# Patient Record
Sex: FEMALE | Race: BLACK OR AFRICAN AMERICAN | NOT HISPANIC OR LATINO
[De-identification: names, ages, dates, MRNs, and addresses within clinical notes are randomized per-mention and may not be internally consistent; named-entity substitution may affect disease eponyms.]

---

## 2017-08-08 ENCOUNTER — APPOINTMENT (OUTPATIENT)
Dept: RADIOLOGY | Facility: CLINIC | Age: 62
End: 2017-08-08
Payer: MEDICAID

## 2017-08-08 ENCOUNTER — APPOINTMENT (OUTPATIENT)
Dept: MAMMOGRAPHY | Facility: CLINIC | Age: 62
End: 2017-08-08
Payer: MEDICAID

## 2017-08-08 ENCOUNTER — OUTPATIENT (OUTPATIENT)
Dept: OUTPATIENT SERVICES | Facility: HOSPITAL | Age: 62
LOS: 1 days | End: 2017-08-08
Payer: MEDICAID

## 2017-08-08 DIAGNOSIS — Z00.8 ENCOUNTER FOR OTHER GENERAL EXAMINATION: ICD-10-CM

## 2017-08-08 DIAGNOSIS — D25.9 LEIOMYOMA OF UTERUS, UNSPECIFIED: Chronic | ICD-10-CM

## 2017-08-08 PROCEDURE — G0202: CPT | Mod: 26

## 2017-08-08 PROCEDURE — 77063 BREAST TOMOSYNTHESIS BI: CPT | Mod: 26

## 2017-08-08 PROCEDURE — 71020: CPT | Mod: 26

## 2017-08-08 PROCEDURE — 71046 X-RAY EXAM CHEST 2 VIEWS: CPT

## 2017-08-08 PROCEDURE — 77067 SCR MAMMO BI INCL CAD: CPT

## 2017-08-08 PROCEDURE — 77063 BREAST TOMOSYNTHESIS BI: CPT

## 2017-08-09 ENCOUNTER — APPOINTMENT (OUTPATIENT)
Dept: ULTRASOUND IMAGING | Facility: CLINIC | Age: 62
End: 2017-08-09
Payer: MEDICAID

## 2017-08-09 ENCOUNTER — OUTPATIENT (OUTPATIENT)
Dept: OUTPATIENT SERVICES | Facility: HOSPITAL | Age: 62
LOS: 1 days | End: 2017-08-09
Payer: MEDICAID

## 2017-08-09 DIAGNOSIS — Z00.8 ENCOUNTER FOR OTHER GENERAL EXAMINATION: ICD-10-CM

## 2017-08-09 DIAGNOSIS — D25.9 LEIOMYOMA OF UTERUS, UNSPECIFIED: Chronic | ICD-10-CM

## 2017-08-09 PROCEDURE — 76856 US EXAM PELVIC COMPLETE: CPT | Mod: 26

## 2017-08-09 PROCEDURE — 76856 US EXAM PELVIC COMPLETE: CPT

## 2017-08-09 PROCEDURE — 76830 TRANSVAGINAL US NON-OB: CPT | Mod: 26

## 2017-08-09 PROCEDURE — 76830 TRANSVAGINAL US NON-OB: CPT

## 2017-09-01 ENCOUNTER — APPOINTMENT (OUTPATIENT)
Dept: OBGYN | Facility: CLINIC | Age: 62
End: 2017-09-01
Payer: MEDICAID

## 2017-09-01 VITALS
HEIGHT: 65 IN | WEIGHT: 188 LBS | DIASTOLIC BLOOD PRESSURE: 80 MMHG | BODY MASS INDEX: 31.32 KG/M2 | SYSTOLIC BLOOD PRESSURE: 122 MMHG

## 2017-09-01 DIAGNOSIS — Z86.19 PERSONAL HISTORY OF OTHER INFECTIOUS AND PARASITIC DISEASES: ICD-10-CM

## 2017-09-01 PROCEDURE — 99396 PREV VISIT EST AGE 40-64: CPT

## 2017-09-01 PROCEDURE — 82270 OCCULT BLOOD FECES: CPT

## 2017-09-01 RX ORDER — ATORVASTATIN CALCIUM 40 MG/1
40 TABLET, FILM COATED ORAL
Qty: 90 | Refills: 0 | Status: ACTIVE | COMMUNITY
Start: 2017-08-02

## 2017-09-01 RX ORDER — LISINOPRIL 20 MG/1
20 TABLET ORAL
Qty: 30 | Refills: 0 | Status: ACTIVE | COMMUNITY
Start: 2017-08-02

## 2017-09-03 LAB
BACTERIA UR CULT: NORMAL
C TRACH RRNA SPEC QL NAA+PROBE: NORMAL
HPV HIGH+LOW RISK DNA PNL CVX: NEGATIVE
N GONORRHOEA RRNA SPEC QL NAA+PROBE: NORMAL
SOURCE AMPLIFICATION: NORMAL
SOURCE TP AMPLIFICATION: NORMAL
T VAGINALIS RRNA SPEC QL NAA+PROBE: NORMAL

## 2017-09-06 ENCOUNTER — APPOINTMENT (OUTPATIENT)
Dept: MRI IMAGING | Facility: CLINIC | Age: 62
End: 2017-09-06

## 2017-09-07 LAB — CYTOLOGY CVX/VAG DOC THIN PREP: NORMAL

## 2018-06-11 ENCOUNTER — APPOINTMENT (OUTPATIENT)
Dept: OBGYN | Facility: CLINIC | Age: 63
End: 2018-06-11
Payer: MEDICAID

## 2018-06-11 VITALS
BODY MASS INDEX: 31.65 KG/M2 | HEIGHT: 65 IN | WEIGHT: 190 LBS | SYSTOLIC BLOOD PRESSURE: 130 MMHG | DIASTOLIC BLOOD PRESSURE: 100 MMHG

## 2018-06-11 DIAGNOSIS — Z87.440 PERSONAL HISTORY OF URINARY (TRACT) INFECTIONS: ICD-10-CM

## 2018-06-11 PROCEDURE — 99213 OFFICE O/P EST LOW 20 MIN: CPT

## 2018-06-11 RX ORDER — NYSTATIN AND TRIAMCINOLONE ACETONIDE 100000; 1 MG/G; MG/G
100000-0.1 CREAM TOPICAL 3 TIMES DAILY
Qty: 9 | Refills: 0 | Status: DISCONTINUED | COMMUNITY
Start: 2018-06-11 | End: 2018-06-11

## 2018-06-11 RX ORDER — GABAPENTIN 300 MG/1
300 CAPSULE ORAL
Qty: 28 | Refills: 0 | Status: DISCONTINUED | COMMUNITY
Start: 2017-08-12 | End: 2018-06-11

## 2018-06-12 LAB
CANDIDA VAG CYTO: NOT DETECTED
G VAGINALIS+PREV SP MTYP VAG QL MICRO: DETECTED
T VAGINALIS VAG QL WET PREP: NOT DETECTED

## 2018-06-13 LAB — BACTERIA UR CULT: NORMAL

## 2018-10-16 ENCOUNTER — APPOINTMENT (OUTPATIENT)
Dept: OBGYN | Facility: CLINIC | Age: 63
End: 2018-10-16
Payer: COMMERCIAL

## 2018-10-16 VITALS
WEIGHT: 190 LBS | BODY MASS INDEX: 31.65 KG/M2 | SYSTOLIC BLOOD PRESSURE: 130 MMHG | DIASTOLIC BLOOD PRESSURE: 90 MMHG | HEIGHT: 65 IN

## 2018-10-16 DIAGNOSIS — Z80.49 FAMILY HISTORY OF MALIGNANT NEOPLASM OF OTHER GENITAL ORGANS: ICD-10-CM

## 2018-10-16 PROCEDURE — 82270 OCCULT BLOOD FECES: CPT

## 2018-10-16 PROCEDURE — 99396 PREV VISIT EST AGE 40-64: CPT

## 2018-10-18 DIAGNOSIS — N76.0 ACUTE VAGINITIS: ICD-10-CM

## 2018-10-18 DIAGNOSIS — B96.89 ACUTE VAGINITIS: ICD-10-CM

## 2018-10-18 LAB
CANDIDA VAG CYTO: NOT DETECTED
G VAGINALIS+PREV SP MTYP VAG QL MICRO: DETECTED
HPV HIGH+LOW RISK DNA PNL CVX: NOT DETECTED
T VAGINALIS VAG QL WET PREP: NOT DETECTED

## 2018-10-20 LAB — CYTOLOGY CVX/VAG DOC THIN PREP: NORMAL

## 2019-07-29 ENCOUNTER — APPOINTMENT (OUTPATIENT)
Dept: OBGYN | Facility: CLINIC | Age: 64
End: 2019-07-29

## 2020-09-04 ENCOUNTER — APPOINTMENT (OUTPATIENT)
Dept: OBGYN | Facility: CLINIC | Age: 65
End: 2020-09-04

## 2020-10-06 ENCOUNTER — APPOINTMENT (OUTPATIENT)
Dept: OBGYN | Facility: CLINIC | Age: 65
End: 2020-10-06
Payer: COMMERCIAL

## 2020-10-06 VITALS
BODY MASS INDEX: 32.49 KG/M2 | DIASTOLIC BLOOD PRESSURE: 90 MMHG | SYSTOLIC BLOOD PRESSURE: 165 MMHG | HEIGHT: 65 IN | WEIGHT: 195 LBS

## 2020-10-06 LAB
BILIRUB UR QL STRIP: NORMAL
GLUCOSE UR-MCNC: NORMAL
HCG UR QL: 0.2 EU/DL
HGB UR QL STRIP.AUTO: NORMAL
KETONES UR-MCNC: NORMAL
LEUKOCYTE ESTERASE UR QL STRIP: NORMAL
NITRITE UR QL STRIP: NORMAL
PH UR STRIP: 6.5
PROT UR STRIP-MCNC: NORMAL
SP GR UR STRIP: 1.01

## 2020-10-06 PROCEDURE — 81003 URINALYSIS AUTO W/O SCOPE: CPT | Mod: QW

## 2020-10-06 PROCEDURE — 99396 PREV VISIT EST AGE 40-64: CPT

## 2020-10-06 RX ORDER — TERCONAZOLE 8 MG/G
0.8 CREAM VAGINAL
Qty: 3 | Refills: 0 | Status: DISCONTINUED | COMMUNITY
Start: 2017-09-01 | End: 2020-10-06

## 2020-10-06 RX ORDER — METRONIDAZOLE 7.5 MG/G
0.75 GEL VAGINAL
Qty: 1 | Refills: 0 | Status: DISCONTINUED | COMMUNITY
Start: 2018-10-18 | End: 2020-10-06

## 2020-10-06 RX ORDER — NYSTATIN 100000 [USP'U]/G
100000 CREAM TOPICAL TWICE DAILY
Qty: 1 | Refills: 3 | Status: DISCONTINUED | COMMUNITY
Start: 2018-06-11 | End: 2020-10-06

## 2020-10-06 RX ORDER — METRONIDAZOLE 7.5 MG/G
0.75 GEL VAGINAL
Qty: 1 | Refills: 0 | Status: DISCONTINUED | COMMUNITY
Start: 2018-06-12 | End: 2020-10-06

## 2020-10-06 RX ORDER — TRIAMCINOLONE ACETONIDE 1 MG/G
0.1 CREAM TOPICAL TWICE DAILY
Qty: 1 | Refills: 0 | Status: DISCONTINUED | COMMUNITY
Start: 2018-06-11 | End: 2020-10-06

## 2020-10-06 NOTE — REVIEW OF SYSTEMS
[Fatigue] : fatigue [Recent Weight Gain (___ Lbs)] : recent [unfilled] ~Ulb weight gain [Sight Problems] : sight problems [SOB on Exertion] : shortness of breath on exertion [Constipation] : constipation [Feeling Weak] : feeling weak [Negative] : Heme/Lymph

## 2020-10-06 NOTE — HISTORY OF PRESENT ILLNESS
[FreeTextEntry1] : Check up.  Occ vaginal discharge and itching.  None at present.  Denies vag bleeding.  Refuses sti screen.  Colonoscopy 2019.  Weight stable.  Socially distancing.  Did not go for mammo last year.

## 2020-10-09 LAB
BACTERIA UR CULT: NORMAL
CANDIDA VAG CYTO: NOT DETECTED
G VAGINALIS+PREV SP MTYP VAG QL MICRO: NOT DETECTED
HPV HIGH+LOW RISK DNA PNL CVX: NOT DETECTED
T VAGINALIS VAG QL WET PREP: NOT DETECTED

## 2020-10-11 LAB — CYTOLOGY CVX/VAG DOC THIN PREP: NORMAL

## 2021-10-15 ENCOUNTER — APPOINTMENT (OUTPATIENT)
Dept: OBGYN | Facility: CLINIC | Age: 66
End: 2021-10-15
Payer: COMMERCIAL

## 2021-10-15 VITALS
HEIGHT: 65 IN | WEIGHT: 191 LBS | BODY MASS INDEX: 31.82 KG/M2 | DIASTOLIC BLOOD PRESSURE: 78 MMHG | SYSTOLIC BLOOD PRESSURE: 122 MMHG

## 2021-10-15 DIAGNOSIS — Z01.419 ENCOUNTER FOR GYNECOLOGICAL EXAMINATION (GENERAL) (ROUTINE) W/OUT ABNORMAL FINDINGS: ICD-10-CM

## 2021-10-15 DIAGNOSIS — N89.8 OTHER SPECIFIED NONINFLAMMATORY DISORDERS OF VAGINA: ICD-10-CM

## 2021-10-15 DIAGNOSIS — R10.2 PELVIC AND PERINEAL PAIN: ICD-10-CM

## 2021-10-15 PROCEDURE — 99396 PREV VISIT EST AGE 40-64: CPT

## 2021-10-15 RX ORDER — METFORMIN HYDROCHLORIDE 1000 MG/1
1000 TABLET, COATED ORAL
Refills: 0 | Status: ACTIVE | COMMUNITY
Start: 2021-10-15

## 2021-10-15 RX ORDER — TERCONAZOLE 8 MG/G
0.8 CREAM VAGINAL
Qty: 1 | Refills: 0 | Status: ACTIVE | COMMUNITY
Start: 2021-10-15 | End: 1900-01-01

## 2021-10-15 RX ORDER — GLIMEPIRIDE 4 MG/1
4 TABLET ORAL
Refills: 0 | Status: ACTIVE | COMMUNITY
Start: 2021-10-15

## 2021-10-15 RX ORDER — SITAGLIPTIN 100 MG/1
100 TABLET, FILM COATED ORAL
Refills: 0 | Status: ACTIVE | COMMUNITY
Start: 2021-10-15

## 2021-10-15 NOTE — HISTORY OF PRESENT ILLNESS
[FreeTextEntry1] : Check up.  Feels well.  Occ discharge and itching.  Late for mammo and dexa.  Colonoscopy 2020.  Covid 19 vaccinated.  Refuses sti screen.

## 2021-10-16 LAB — HPV HIGH+LOW RISK DNA PNL CVX: NOT DETECTED

## 2021-10-17 LAB — BACTERIA UR CULT: NORMAL

## 2021-10-25 LAB — CYTOLOGY CVX/VAG DOC THIN PREP: ABNORMAL

## 2021-12-13 ENCOUNTER — RESULT REVIEW (OUTPATIENT)
Age: 66
End: 2021-12-13

## 2021-12-13 ENCOUNTER — OUTPATIENT (OUTPATIENT)
Dept: OUTPATIENT SERVICES | Facility: HOSPITAL | Age: 66
LOS: 1 days | End: 2021-12-13
Payer: COMMERCIAL

## 2021-12-13 ENCOUNTER — APPOINTMENT (OUTPATIENT)
Dept: ULTRASOUND IMAGING | Facility: CLINIC | Age: 66
End: 2021-12-13
Payer: COMMERCIAL

## 2021-12-13 DIAGNOSIS — D25.9 LEIOMYOMA OF UTERUS, UNSPECIFIED: Chronic | ICD-10-CM

## 2021-12-13 DIAGNOSIS — Z00.00 ENCOUNTER FOR GENERAL ADULT MEDICAL EXAMINATION WITHOUT ABNORMAL FINDINGS: ICD-10-CM

## 2021-12-13 PROCEDURE — 76536 US EXAM OF HEAD AND NECK: CPT | Mod: 26

## 2021-12-13 PROCEDURE — 76536 US EXAM OF HEAD AND NECK: CPT

## 2021-12-16 ENCOUNTER — APPOINTMENT (OUTPATIENT)
Dept: RADIOLOGY | Facility: CLINIC | Age: 66
End: 2021-12-16
Payer: COMMERCIAL

## 2021-12-16 ENCOUNTER — OUTPATIENT (OUTPATIENT)
Dept: OUTPATIENT SERVICES | Facility: HOSPITAL | Age: 66
LOS: 1 days | End: 2021-12-16
Payer: COMMERCIAL

## 2021-12-16 ENCOUNTER — RESULT REVIEW (OUTPATIENT)
Age: 66
End: 2021-12-16

## 2021-12-16 DIAGNOSIS — D25.9 LEIOMYOMA OF UTERUS, UNSPECIFIED: Chronic | ICD-10-CM

## 2021-12-16 DIAGNOSIS — Z00.8 ENCOUNTER FOR OTHER GENERAL EXAMINATION: ICD-10-CM

## 2021-12-16 PROCEDURE — 77080 DXA BONE DENSITY AXIAL: CPT | Mod: 26

## 2021-12-16 PROCEDURE — 77080 DXA BONE DENSITY AXIAL: CPT

## 2022-01-25 ENCOUNTER — APPOINTMENT (OUTPATIENT)
Dept: MAMMOGRAPHY | Facility: CLINIC | Age: 67
End: 2022-01-25

## 2022-01-25 ENCOUNTER — APPOINTMENT (OUTPATIENT)
Dept: ULTRASOUND IMAGING | Facility: CLINIC | Age: 67
End: 2022-01-25

## 2024-01-03 ENCOUNTER — INPATIENT (INPATIENT)
Facility: HOSPITAL | Age: 69
LOS: 2 days | Discharge: HOME HEALTH | End: 2024-01-06
Attending: INTERNAL MEDICINE | Admitting: INTERNAL MEDICINE
Payer: MEDICARE

## 2024-01-03 VITALS
TEMPERATURE: 98 F | HEART RATE: 83 BPM | DIASTOLIC BLOOD PRESSURE: 96 MMHG | RESPIRATION RATE: 15 BRPM | SYSTOLIC BLOOD PRESSURE: 160 MMHG | OXYGEN SATURATION: 100 %

## 2024-01-03 DIAGNOSIS — D25.9 LEIOMYOMA OF UTERUS, UNSPECIFIED: Chronic | ICD-10-CM

## 2024-01-03 PROCEDURE — 73564 X-RAY EXAM KNEE 4 OR MORE: CPT | Mod: 26,50

## 2024-01-03 PROCEDURE — 99285 EMERGENCY DEPT VISIT HI MDM: CPT | Mod: 25

## 2024-01-03 PROCEDURE — 20610 DRAIN/INJ JOINT/BURSA W/O US: CPT | Mod: LT

## 2024-01-03 NOTE — ED PROVIDER NOTE - ATTENDING APP SHARED VISIT CONTRIBUTION OF CARE
I have personally performed a face to face medical and diagnostic evaluation of the patient. I have discussed with and reviewed the ACP's note and agree with the History, ROS, Physical Exam and MDM unless otherwise indicated. A brief summary of my personal evaluation and impression can be found below.     68-year-old female, past medical history of hypertension, diabetes, chronic bilateral knee pain secondary to osteoarthritis presented to the ED for acute exacerbation of bilateral knee pain. On presentation patient appears uncomfortable, on exam diffuse swelling of the left knee without redness crepitus, or signs of septic joint.  Plan for routine labs, analgesics, x-ray, joint arthrocentesis, CT abdomen pelvis to rule out possible diverticulitis. Reassess to dispo. Will assess results/pain/ ambulatory status for dispo. John Alvarez, ED Attending

## 2024-01-03 NOTE — ED PROVIDER NOTE - MUSCULOSKELETAL, MLM
+ tenderness swelling of the left knee > greater than right. no redness, crepitus, limitations with ROM. unable to bear weight. Spine appears normal, range of motion is not limited, no muscle or joint tenderness

## 2024-01-03 NOTE — ED ADULT TRIAGE NOTE - CHIEF COMPLAINT QUOTE
presents C/O B/L knee pain. unable to walk. deneis trauma or injury. No complaints of chest pain, headache, nausea, dizziness, vomiting  SOB, fever, chills verbalized. phxDM2 HTN

## 2024-01-03 NOTE — ED PROVIDER NOTE - PROGRESS NOTE DETAILS
On reassessment despite pain medication, joint aspiration patient still unable to ambulate.  Patient will be admitted for pain control and PT eval

## 2024-01-03 NOTE — ED PROVIDER NOTE - NSICDXPASTSURGICALHX_GEN_ALL_CORE_FT
PAST SURGICAL HISTORY:  Fibroid uterus had surgery ( 2 years ago )    Missed      S/P  Section times 2-,     Status Post Biopsy Uterine-11    Status Post Umbilical Hernia Repair

## 2024-01-03 NOTE — ED PROVIDER NOTE - CLINICAL SUMMARY MEDICAL DECISION MAKING FREE TEXT BOX
Patient is a 68-year-old female, past medical history of hypertension, diabetes, chronic bilateral knee pain secondary to osteoarthritis presented to the ED for acute exacerbation of bilateral knee pain. On presentation patient appears uncomfortable, on exam diffuse swelling of the left knee without redness crepitus, or signs of septic joint.  Plan for routine labs, analgesics, x-ray, joint arthrocentesis, CT abdomen pelvis to rule out possible diverticulitis.

## 2024-01-03 NOTE — ED PROVIDER NOTE - NSICDXPASTMEDICALHX_GEN_ALL_CORE_FT
PAST MEDICAL HISTORY:  Bulging Discs     CAD (Coronary Artery Disease)     Cardiomegaly     Diabetes mellitus     Fibroids     GERD (Gastroesophageal Reflux Disease)     Herniated Disc Lumbar    HTN (Hypertension)     Hyperlipidemia     Hyperthyroidism     Hyperthyroidism     Obese     Sickle Cell Trait

## 2024-01-03 NOTE — ED PROVIDER NOTE - OBJECTIVE STATEMENT
Patient is a 68-year-old female, past medical history of hypertension, diabetes, chronic bilateral knee pain secondary to osteoarthritis presented to the ED for acute exacerbation of bilateral knee pain.  She reports sharp pain along the joint line/behind kneecap.  Patient denies recent injury, trauma prior to the onset of pain.  No associated redness, fever, chills no limitations to range of motion. Patient is a 68-year-old female, past medical history of hypertension, diabetes, chronic bilateral knee pain secondary to osteoarthritis presented to the ED for acute exacerbation of bilateral knee pain.  She reports sharp pain along the joint line/behind kneecap.  Patient denies recent injury, trauma prior to the onset of pain.  No associated redness, fever, chills no limitations to range of motion, redness of joint.

## 2024-01-04 DIAGNOSIS — I10 ESSENTIAL (PRIMARY) HYPERTENSION: ICD-10-CM

## 2024-01-04 DIAGNOSIS — M25.469 EFFUSION, UNSPECIFIED KNEE: ICD-10-CM

## 2024-01-04 DIAGNOSIS — M79.605 PAIN IN LEFT LEG: ICD-10-CM

## 2024-01-04 DIAGNOSIS — N18.9 CHRONIC KIDNEY DISEASE, UNSPECIFIED: ICD-10-CM

## 2024-01-04 DIAGNOSIS — E78.5 HYPERLIPIDEMIA, UNSPECIFIED: ICD-10-CM

## 2024-01-04 DIAGNOSIS — M25.561 PAIN IN RIGHT KNEE: ICD-10-CM

## 2024-01-04 DIAGNOSIS — E11.9 TYPE 2 DIABETES MELLITUS WITHOUT COMPLICATIONS: ICD-10-CM

## 2024-01-04 DIAGNOSIS — Z29.9 ENCOUNTER FOR PROPHYLACTIC MEASURES, UNSPECIFIED: ICD-10-CM

## 2024-01-04 DIAGNOSIS — I25.10 ATHEROSCLEROTIC HEART DISEASE OF NATIVE CORONARY ARTERY WITHOUT ANGINA PECTORIS: ICD-10-CM

## 2024-01-04 LAB
A1C WITH ESTIMATED AVERAGE GLUCOSE RESULT: 10.1 % — HIGH (ref 4–5.6)
A1C WITH ESTIMATED AVERAGE GLUCOSE RESULT: 10.1 % — HIGH (ref 4–5.6)
ALBUMIN SERPL ELPH-MCNC: 3.8 G/DL — SIGNIFICANT CHANGE UP (ref 3.3–5)
ALBUMIN SERPL ELPH-MCNC: 3.8 G/DL — SIGNIFICANT CHANGE UP (ref 3.3–5)
ALP SERPL-CCNC: 82 U/L — SIGNIFICANT CHANGE UP (ref 40–120)
ALP SERPL-CCNC: 82 U/L — SIGNIFICANT CHANGE UP (ref 40–120)
ALT FLD-CCNC: 15 U/L — SIGNIFICANT CHANGE UP (ref 4–33)
ALT FLD-CCNC: 15 U/L — SIGNIFICANT CHANGE UP (ref 4–33)
ANION GAP SERPL CALC-SCNC: 9 MMOL/L — SIGNIFICANT CHANGE UP (ref 7–14)
ANION GAP SERPL CALC-SCNC: 9 MMOL/L — SIGNIFICANT CHANGE UP (ref 7–14)
APPEARANCE UR: CLEAR — SIGNIFICANT CHANGE UP
APPEARANCE UR: CLEAR — SIGNIFICANT CHANGE UP
AST SERPL-CCNC: 18 U/L — SIGNIFICANT CHANGE UP (ref 4–32)
AST SERPL-CCNC: 18 U/L — SIGNIFICANT CHANGE UP (ref 4–32)
B PERT IGG+IGM PNL SER: ABNORMAL
B PERT IGG+IGM PNL SER: ABNORMAL
BACTERIA # UR AUTO: NEGATIVE /HPF — SIGNIFICANT CHANGE UP
BACTERIA # UR AUTO: NEGATIVE /HPF — SIGNIFICANT CHANGE UP
BASOPHILS # BLD AUTO: 0.02 K/UL — SIGNIFICANT CHANGE UP (ref 0–0.2)
BASOPHILS # BLD AUTO: 0.02 K/UL — SIGNIFICANT CHANGE UP (ref 0–0.2)
BASOPHILS NFR BLD AUTO: 0.3 % — SIGNIFICANT CHANGE UP (ref 0–2)
BASOPHILS NFR BLD AUTO: 0.3 % — SIGNIFICANT CHANGE UP (ref 0–2)
BILIRUB SERPL-MCNC: 0.7 MG/DL — SIGNIFICANT CHANGE UP (ref 0.2–1.2)
BILIRUB SERPL-MCNC: 0.7 MG/DL — SIGNIFICANT CHANGE UP (ref 0.2–1.2)
BILIRUB UR-MCNC: NEGATIVE — SIGNIFICANT CHANGE UP
BILIRUB UR-MCNC: NEGATIVE — SIGNIFICANT CHANGE UP
BUN SERPL-MCNC: 19 MG/DL — SIGNIFICANT CHANGE UP (ref 7–23)
BUN SERPL-MCNC: 19 MG/DL — SIGNIFICANT CHANGE UP (ref 7–23)
CALCIUM SERPL-MCNC: 10.5 MG/DL — SIGNIFICANT CHANGE UP (ref 8.4–10.5)
CALCIUM SERPL-MCNC: 10.5 MG/DL — SIGNIFICANT CHANGE UP (ref 8.4–10.5)
CAST: 0 /LPF — SIGNIFICANT CHANGE UP (ref 0–4)
CAST: 0 /LPF — SIGNIFICANT CHANGE UP (ref 0–4)
CHLORIDE SERPL-SCNC: 103 MMOL/L — SIGNIFICANT CHANGE UP (ref 98–107)
CHLORIDE SERPL-SCNC: 103 MMOL/L — SIGNIFICANT CHANGE UP (ref 98–107)
CO2 SERPL-SCNC: 26 MMOL/L — SIGNIFICANT CHANGE UP (ref 22–31)
CO2 SERPL-SCNC: 26 MMOL/L — SIGNIFICANT CHANGE UP (ref 22–31)
COLOR FLD: YELLOW
COLOR FLD: YELLOW
COLOR SPEC: YELLOW — SIGNIFICANT CHANGE UP
COLOR SPEC: YELLOW — SIGNIFICANT CHANGE UP
COMMENT - FLUIDS: SIGNIFICANT CHANGE UP
COMMENT - FLUIDS: SIGNIFICANT CHANGE UP
CREAT SERPL-MCNC: 1.25 MG/DL — SIGNIFICANT CHANGE UP (ref 0.5–1.3)
CREAT SERPL-MCNC: 1.25 MG/DL — SIGNIFICANT CHANGE UP (ref 0.5–1.3)
CRYSTALS, BODY FLUID CLARITY: SIGNIFICANT CHANGE UP
CRYSTALS, BODY FLUID CLARITY: SIGNIFICANT CHANGE UP
CRYSTALS, BODY FLUID COLOR: YELLOW
CRYSTALS, BODY FLUID COLOR: YELLOW
CRYSTALS, BODY FLUID ID: SIGNIFICANT CHANGE UP
CRYSTALS, BODY FLUID ID: SIGNIFICANT CHANGE UP
CRYSTALS, BODY FLUID TUBE: SIGNIFICANT CHANGE UP
CRYSTALS, BODY FLUID TUBE: SIGNIFICANT CHANGE UP
DIFF PNL FLD: NEGATIVE — SIGNIFICANT CHANGE UP
DIFF PNL FLD: NEGATIVE — SIGNIFICANT CHANGE UP
EGFR: 48 ML/MIN/1.73M2 — LOW
EGFR: 48 ML/MIN/1.73M2 — LOW
EOSINOPHIL # BLD AUTO: 0.37 K/UL — SIGNIFICANT CHANGE UP (ref 0–0.5)
EOSINOPHIL # BLD AUTO: 0.37 K/UL — SIGNIFICANT CHANGE UP (ref 0–0.5)
EOSINOPHIL # FLD: 0 % — SIGNIFICANT CHANGE UP
EOSINOPHIL # FLD: 0 % — SIGNIFICANT CHANGE UP
EOSINOPHIL NFR BLD AUTO: 5.4 % — SIGNIFICANT CHANGE UP (ref 0–6)
EOSINOPHIL NFR BLD AUTO: 5.4 % — SIGNIFICANT CHANGE UP (ref 0–6)
ESTIMATED AVERAGE GLUCOSE: 243 — SIGNIFICANT CHANGE UP
ESTIMATED AVERAGE GLUCOSE: 243 — SIGNIFICANT CHANGE UP
FLUID INTAKE SUBSTANCE CLASS: SIGNIFICANT CHANGE UP
FLUID INTAKE SUBSTANCE CLASS: SIGNIFICANT CHANGE UP
FOLATE+VIT B12 SERBLD-IMP: 0 % — SIGNIFICANT CHANGE UP
FOLATE+VIT B12 SERBLD-IMP: 0 % — SIGNIFICANT CHANGE UP
GLUCOSE BLDC GLUCOMTR-MCNC: 155 MG/DL — HIGH (ref 70–99)
GLUCOSE BLDC GLUCOMTR-MCNC: 155 MG/DL — HIGH (ref 70–99)
GLUCOSE BLDC GLUCOMTR-MCNC: 216 MG/DL — HIGH (ref 70–99)
GLUCOSE BLDC GLUCOMTR-MCNC: 216 MG/DL — HIGH (ref 70–99)
GLUCOSE BLDC GLUCOMTR-MCNC: 260 MG/DL — HIGH (ref 70–99)
GLUCOSE BLDC GLUCOMTR-MCNC: 260 MG/DL — HIGH (ref 70–99)
GLUCOSE BLDC GLUCOMTR-MCNC: 296 MG/DL — HIGH (ref 70–99)
GLUCOSE BLDC GLUCOMTR-MCNC: 296 MG/DL — HIGH (ref 70–99)
GLUCOSE FLD-MCNC: 144 MG/DL — SIGNIFICANT CHANGE UP
GLUCOSE FLD-MCNC: 144 MG/DL — SIGNIFICANT CHANGE UP
GLUCOSE SERPL-MCNC: 148 MG/DL — HIGH (ref 70–99)
GLUCOSE SERPL-MCNC: 148 MG/DL — HIGH (ref 70–99)
GLUCOSE UR QL: NEGATIVE MG/DL — SIGNIFICANT CHANGE UP
GLUCOSE UR QL: NEGATIVE MG/DL — SIGNIFICANT CHANGE UP
GRAM STN FLD: SIGNIFICANT CHANGE UP
GRAM STN FLD: SIGNIFICANT CHANGE UP
HCT VFR BLD CALC: 35.3 % — SIGNIFICANT CHANGE UP (ref 34.5–45)
HCT VFR BLD CALC: 35.3 % — SIGNIFICANT CHANGE UP (ref 34.5–45)
HGB BLD-MCNC: 11.5 G/DL — SIGNIFICANT CHANGE UP (ref 11.5–15.5)
HGB BLD-MCNC: 11.5 G/DL — SIGNIFICANT CHANGE UP (ref 11.5–15.5)
IANC: 2.97 K/UL — SIGNIFICANT CHANGE UP (ref 1.8–7.4)
IANC: 2.97 K/UL — SIGNIFICANT CHANGE UP (ref 1.8–7.4)
IMM GRANULOCYTES NFR BLD AUTO: 0.1 % — SIGNIFICANT CHANGE UP (ref 0–0.9)
IMM GRANULOCYTES NFR BLD AUTO: 0.1 % — SIGNIFICANT CHANGE UP (ref 0–0.9)
KETONES UR-MCNC: NEGATIVE MG/DL — SIGNIFICANT CHANGE UP
KETONES UR-MCNC: NEGATIVE MG/DL — SIGNIFICANT CHANGE UP
LEUKOCYTE ESTERASE UR-ACNC: ABNORMAL
LEUKOCYTE ESTERASE UR-ACNC: ABNORMAL
LYMPHOCYTES # BLD AUTO: 3 K/UL — SIGNIFICANT CHANGE UP (ref 1–3.3)
LYMPHOCYTES # BLD AUTO: 3 K/UL — SIGNIFICANT CHANGE UP (ref 1–3.3)
LYMPHOCYTES # BLD AUTO: 43.8 % — SIGNIFICANT CHANGE UP (ref 13–44)
LYMPHOCYTES # BLD AUTO: 43.8 % — SIGNIFICANT CHANGE UP (ref 13–44)
LYMPHOCYTES # FLD: 1 % — SIGNIFICANT CHANGE UP
LYMPHOCYTES # FLD: 1 % — SIGNIFICANT CHANGE UP
MCHC RBC-ENTMCNC: 26.1 PG — LOW (ref 27–34)
MCHC RBC-ENTMCNC: 26.1 PG — LOW (ref 27–34)
MCHC RBC-ENTMCNC: 32.6 GM/DL — SIGNIFICANT CHANGE UP (ref 32–36)
MCHC RBC-ENTMCNC: 32.6 GM/DL — SIGNIFICANT CHANGE UP (ref 32–36)
MCV RBC AUTO: 80.2 FL — SIGNIFICANT CHANGE UP (ref 80–100)
MCV RBC AUTO: 80.2 FL — SIGNIFICANT CHANGE UP (ref 80–100)
MESOTHL CELL # FLD: 0 % — SIGNIFICANT CHANGE UP
MESOTHL CELL # FLD: 0 % — SIGNIFICANT CHANGE UP
MONOCYTES # BLD AUTO: 0.48 K/UL — SIGNIFICANT CHANGE UP (ref 0–0.9)
MONOCYTES # BLD AUTO: 0.48 K/UL — SIGNIFICANT CHANGE UP (ref 0–0.9)
MONOCYTES NFR BLD AUTO: 7 % — SIGNIFICANT CHANGE UP (ref 2–14)
MONOCYTES NFR BLD AUTO: 7 % — SIGNIFICANT CHANGE UP (ref 2–14)
MONOS+MACROS # FLD: 24 % — SIGNIFICANT CHANGE UP
MONOS+MACROS # FLD: 24 % — SIGNIFICANT CHANGE UP
NEUTROPHILS # BLD AUTO: 2.97 K/UL — SIGNIFICANT CHANGE UP (ref 1.8–7.4)
NEUTROPHILS # BLD AUTO: 2.97 K/UL — SIGNIFICANT CHANGE UP (ref 1.8–7.4)
NEUTROPHILS NFR BLD AUTO: 43.4 % — SIGNIFICANT CHANGE UP (ref 43–77)
NEUTROPHILS NFR BLD AUTO: 43.4 % — SIGNIFICANT CHANGE UP (ref 43–77)
NEUTROPHILS-BODY FLUID: 75 % — SIGNIFICANT CHANGE UP
NEUTROPHILS-BODY FLUID: 75 % — SIGNIFICANT CHANGE UP
NITRITE UR-MCNC: NEGATIVE — SIGNIFICANT CHANGE UP
NITRITE UR-MCNC: NEGATIVE — SIGNIFICANT CHANGE UP
NRBC # BLD: 0 /100 WBCS — SIGNIFICANT CHANGE UP (ref 0–0)
NRBC # BLD: 0 /100 WBCS — SIGNIFICANT CHANGE UP (ref 0–0)
NRBC # FLD: 0 K/UL — SIGNIFICANT CHANGE UP (ref 0–0)
NRBC # FLD: 0 K/UL — SIGNIFICANT CHANGE UP (ref 0–0)
OTHER CELLS FLD MANUAL: 0 % — SIGNIFICANT CHANGE UP
OTHER CELLS FLD MANUAL: 0 % — SIGNIFICANT CHANGE UP
PH UR: 6.5 — SIGNIFICANT CHANGE UP (ref 5–8)
PH UR: 6.5 — SIGNIFICANT CHANGE UP (ref 5–8)
PLATELET # BLD AUTO: 218 K/UL — SIGNIFICANT CHANGE UP (ref 150–400)
PLATELET # BLD AUTO: 218 K/UL — SIGNIFICANT CHANGE UP (ref 150–400)
POTASSIUM SERPL-MCNC: 4 MMOL/L — SIGNIFICANT CHANGE UP (ref 3.5–5.3)
POTASSIUM SERPL-MCNC: 4 MMOL/L — SIGNIFICANT CHANGE UP (ref 3.5–5.3)
POTASSIUM SERPL-SCNC: 4 MMOL/L — SIGNIFICANT CHANGE UP (ref 3.5–5.3)
POTASSIUM SERPL-SCNC: 4 MMOL/L — SIGNIFICANT CHANGE UP (ref 3.5–5.3)
PROT FLD-MCNC: 4.2 G/DL — SIGNIFICANT CHANGE UP
PROT FLD-MCNC: 4.2 G/DL — SIGNIFICANT CHANGE UP
PROT SERPL-MCNC: 7.4 G/DL — SIGNIFICANT CHANGE UP (ref 6–8.3)
PROT SERPL-MCNC: 7.4 G/DL — SIGNIFICANT CHANGE UP (ref 6–8.3)
PROT UR-MCNC: SIGNIFICANT CHANGE UP MG/DL
PROT UR-MCNC: SIGNIFICANT CHANGE UP MG/DL
RBC # BLD: 4.4 M/UL — SIGNIFICANT CHANGE UP (ref 3.8–5.2)
RBC # BLD: 4.4 M/UL — SIGNIFICANT CHANGE UP (ref 3.8–5.2)
RBC # FLD: 14.6 % — HIGH (ref 10.3–14.5)
RBC # FLD: 14.6 % — HIGH (ref 10.3–14.5)
RBC CASTS # UR COMP ASSIST: 0 /HPF — SIGNIFICANT CHANGE UP (ref 0–4)
RBC CASTS # UR COMP ASSIST: 0 /HPF — SIGNIFICANT CHANGE UP (ref 0–4)
RCV VOL RI: <2000 CELLS/UL — HIGH (ref 0–5)
RCV VOL RI: <2000 CELLS/UL — HIGH (ref 0–5)
SODIUM SERPL-SCNC: 138 MMOL/L — SIGNIFICANT CHANGE UP (ref 135–145)
SODIUM SERPL-SCNC: 138 MMOL/L — SIGNIFICANT CHANGE UP (ref 135–145)
SP GR SPEC: 1.01 — SIGNIFICANT CHANGE UP (ref 1–1.03)
SP GR SPEC: 1.01 — SIGNIFICANT CHANGE UP (ref 1–1.03)
SPECIMEN SOURCE: SIGNIFICANT CHANGE UP
SPECIMEN SOURCE: SIGNIFICANT CHANGE UP
SQUAMOUS # UR AUTO: 1 /HPF — SIGNIFICANT CHANGE UP (ref 0–5)
SQUAMOUS # UR AUTO: 1 /HPF — SIGNIFICANT CHANGE UP (ref 0–5)
TOTAL CELLS COUNTED, BODY FLUID: 100 CELLS — SIGNIFICANT CHANGE UP
TOTAL CELLS COUNTED, BODY FLUID: 100 CELLS — SIGNIFICANT CHANGE UP
TOTAL NUCLEATED CELL COUNT, BODY FLUID: 8640 CELLS/UL — HIGH (ref 0–5)
TOTAL NUCLEATED CELL COUNT, BODY FLUID: 8640 CELLS/UL — HIGH (ref 0–5)
TUBE TYPE: SIGNIFICANT CHANGE UP
TUBE TYPE: SIGNIFICANT CHANGE UP
UROBILINOGEN FLD QL: 0.2 MG/DL — SIGNIFICANT CHANGE UP (ref 0.2–1)
UROBILINOGEN FLD QL: 0.2 MG/DL — SIGNIFICANT CHANGE UP (ref 0.2–1)
WBC # BLD: 6.85 K/UL — SIGNIFICANT CHANGE UP (ref 3.8–10.5)
WBC # BLD: 6.85 K/UL — SIGNIFICANT CHANGE UP (ref 3.8–10.5)
WBC # FLD AUTO: 6.85 K/UL — SIGNIFICANT CHANGE UP (ref 3.8–10.5)
WBC # FLD AUTO: 6.85 K/UL — SIGNIFICANT CHANGE UP (ref 3.8–10.5)
WBC UR QL: 0 /HPF — SIGNIFICANT CHANGE UP (ref 0–5)
WBC UR QL: 0 /HPF — SIGNIFICANT CHANGE UP (ref 0–5)

## 2024-01-04 PROCEDURE — 88108 CYTOPATH CONCENTRATE TECH: CPT | Mod: 26

## 2024-01-04 PROCEDURE — 74177 CT ABD & PELVIS W/CONTRAST: CPT | Mod: 26,MA

## 2024-01-04 PROCEDURE — 99223 1ST HOSP IP/OBS HIGH 75: CPT | Mod: GC

## 2024-01-04 RX ORDER — ATORVASTATIN CALCIUM 80 MG/1
40 TABLET, FILM COATED ORAL AT BEDTIME
Refills: 0 | Status: DISCONTINUED | OUTPATIENT
Start: 2024-01-04 | End: 2024-01-06

## 2024-01-04 RX ORDER — ASPIRIN/CALCIUM CARB/MAGNESIUM 324 MG
81 TABLET ORAL DAILY
Refills: 0 | Status: DISCONTINUED | OUTPATIENT
Start: 2024-01-04 | End: 2024-01-06

## 2024-01-04 RX ORDER — LANOLIN ALCOHOL/MO/W.PET/CERES
3 CREAM (GRAM) TOPICAL AT BEDTIME
Refills: 0 | Status: DISCONTINUED | OUTPATIENT
Start: 2024-01-04 | End: 2024-01-06

## 2024-01-04 RX ORDER — ACETAMINOPHEN 500 MG
650 TABLET ORAL ONCE
Refills: 0 | Status: COMPLETED | OUTPATIENT
Start: 2024-01-04 | End: 2024-01-04

## 2024-01-04 RX ORDER — LISINOPRIL 2.5 MG/1
20 TABLET ORAL DAILY
Refills: 0 | Status: DISCONTINUED | OUTPATIENT
Start: 2024-01-04 | End: 2024-01-05

## 2024-01-04 RX ORDER — INFLUENZA VIRUS VACCINE 15; 15; 15; 15 UG/.5ML; UG/.5ML; UG/.5ML; UG/.5ML
0.7 SUSPENSION INTRAMUSCULAR ONCE
Refills: 0 | Status: DISCONTINUED | OUTPATIENT
Start: 2024-01-04 | End: 2024-01-06

## 2024-01-04 RX ORDER — LISINOPRIL 2.5 MG/1
1 TABLET ORAL
Refills: 0 | DISCHARGE

## 2024-01-04 RX ORDER — DEXTROSE 50 % IN WATER 50 %
25 SYRINGE (ML) INTRAVENOUS ONCE
Refills: 0 | Status: DISCONTINUED | OUTPATIENT
Start: 2024-01-04 | End: 2024-01-06

## 2024-01-04 RX ORDER — SODIUM CHLORIDE 9 MG/ML
1000 INJECTION, SOLUTION INTRAVENOUS
Refills: 0 | Status: DISCONTINUED | OUTPATIENT
Start: 2024-01-04 | End: 2024-01-06

## 2024-01-04 RX ORDER — POLYETHYLENE GLYCOL 3350 17 G/17G
17 POWDER, FOR SOLUTION ORAL DAILY
Refills: 0 | Status: DISCONTINUED | OUTPATIENT
Start: 2024-01-04 | End: 2024-01-06

## 2024-01-04 RX ORDER — KETOROLAC TROMETHAMINE 30 MG/ML
15 SYRINGE (ML) INJECTION ONCE
Refills: 0 | Status: DISCONTINUED | OUTPATIENT
Start: 2024-01-04 | End: 2024-01-04

## 2024-01-04 RX ORDER — INSULIN LISPRO 100/ML
VIAL (ML) SUBCUTANEOUS
Refills: 0 | Status: DISCONTINUED | OUTPATIENT
Start: 2024-01-04 | End: 2024-01-06

## 2024-01-04 RX ORDER — OXYCODONE HYDROCHLORIDE 5 MG/1
2.5 TABLET ORAL EVERY 4 HOURS
Refills: 0 | Status: DISCONTINUED | OUTPATIENT
Start: 2024-01-04 | End: 2024-01-06

## 2024-01-04 RX ORDER — ACETAMINOPHEN 500 MG
1000 TABLET ORAL EVERY 8 HOURS
Refills: 0 | Status: DISCONTINUED | OUTPATIENT
Start: 2024-01-04 | End: 2024-01-04

## 2024-01-04 RX ORDER — INSULIN LISPRO 100/ML
VIAL (ML) SUBCUTANEOUS AT BEDTIME
Refills: 0 | Status: DISCONTINUED | OUTPATIENT
Start: 2024-01-04 | End: 2024-01-06

## 2024-01-04 RX ORDER — FOLIC ACID/VIT B COMPLEX AND C 400 MCG
0 TABLET ORAL
Refills: 0 | DISCHARGE

## 2024-01-04 RX ORDER — ASPIRIN/CALCIUM CARB/MAGNESIUM 324 MG
1 TABLET ORAL
Refills: 0 | DISCHARGE

## 2024-01-04 RX ORDER — HEPARIN SODIUM 5000 [USP'U]/ML
5000 INJECTION INTRAVENOUS; SUBCUTANEOUS EVERY 8 HOURS
Refills: 0 | Status: DISCONTINUED | OUTPATIENT
Start: 2024-01-04 | End: 2024-01-06

## 2024-01-04 RX ORDER — ACETAMINOPHEN 500 MG
650 TABLET ORAL EVERY 6 HOURS
Refills: 0 | Status: DISCONTINUED | OUTPATIENT
Start: 2024-01-04 | End: 2024-01-06

## 2024-01-04 RX ORDER — DEXTROSE 50 % IN WATER 50 %
12.5 SYRINGE (ML) INTRAVENOUS ONCE
Refills: 0 | Status: DISCONTINUED | OUTPATIENT
Start: 2024-01-04 | End: 2024-01-06

## 2024-01-04 RX ORDER — NALOXONE HYDROCHLORIDE 4 MG/.1ML
0.4 SPRAY NASAL ONCE
Refills: 0 | Status: DISCONTINUED | OUTPATIENT
Start: 2024-01-04 | End: 2024-01-06

## 2024-01-04 RX ORDER — ONDANSETRON 8 MG/1
4 TABLET, FILM COATED ORAL EVERY 8 HOURS
Refills: 0 | Status: DISCONTINUED | OUTPATIENT
Start: 2024-01-04 | End: 2024-01-06

## 2024-01-04 RX ORDER — ASCORBIC ACID 60 MG
0 TABLET,CHEWABLE ORAL
Refills: 0 | DISCHARGE

## 2024-01-04 RX ORDER — OXYCODONE HYDROCHLORIDE 5 MG/1
5 TABLET ORAL EVERY 4 HOURS
Refills: 0 | Status: DISCONTINUED | OUTPATIENT
Start: 2024-01-04 | End: 2024-01-06

## 2024-01-04 RX ORDER — SODIUM CHLORIDE 9 MG/ML
1000 INJECTION INTRAMUSCULAR; INTRAVENOUS; SUBCUTANEOUS ONCE
Refills: 0 | Status: COMPLETED | OUTPATIENT
Start: 2024-01-04 | End: 2024-01-04

## 2024-01-04 RX ORDER — SENNA PLUS 8.6 MG/1
2 TABLET ORAL AT BEDTIME
Refills: 0 | Status: DISCONTINUED | OUTPATIENT
Start: 2024-01-04 | End: 2024-01-06

## 2024-01-04 RX ORDER — DEXTROSE 50 % IN WATER 50 %
15 SYRINGE (ML) INTRAVENOUS ONCE
Refills: 0 | Status: DISCONTINUED | OUTPATIENT
Start: 2024-01-04 | End: 2024-01-06

## 2024-01-04 RX ORDER — LIDOCAINE 4 G/100G
1 CREAM TOPICAL EVERY 24 HOURS
Refills: 0 | Status: DISCONTINUED | OUTPATIENT
Start: 2024-01-04 | End: 2024-01-06

## 2024-01-04 RX ORDER — ERGOCALCIFEROL 1.25 MG/1
0 CAPSULE ORAL
Refills: 0 | DISCHARGE

## 2024-01-04 RX ORDER — GLUCAGON INJECTION, SOLUTION 0.5 MG/.1ML
1 INJECTION, SOLUTION SUBCUTANEOUS ONCE
Refills: 0 | Status: DISCONTINUED | OUTPATIENT
Start: 2024-01-04 | End: 2024-01-06

## 2024-01-04 RX ADMIN — HEPARIN SODIUM 5000 UNIT(S): 5000 INJECTION INTRAVENOUS; SUBCUTANEOUS at 21:50

## 2024-01-04 RX ADMIN — Medication 15 MILLIGRAM(S): at 04:23

## 2024-01-04 RX ADMIN — Medication 650 MILLIGRAM(S): at 01:13

## 2024-01-04 RX ADMIN — POLYETHYLENE GLYCOL 3350 17 GRAM(S): 17 POWDER, FOR SOLUTION ORAL at 17:51

## 2024-01-04 RX ADMIN — SODIUM CHLORIDE 75 MILLILITER(S): 9 INJECTION, SOLUTION INTRAVENOUS at 14:56

## 2024-01-04 RX ADMIN — Medication 1000 MILLIGRAM(S): at 13:24

## 2024-01-04 RX ADMIN — ATORVASTATIN CALCIUM 40 MILLIGRAM(S): 80 TABLET, FILM COATED ORAL at 21:50

## 2024-01-04 RX ADMIN — SODIUM CHLORIDE 1000 MILLILITER(S): 9 INJECTION INTRAMUSCULAR; INTRAVENOUS; SUBCUTANEOUS at 04:49

## 2024-01-04 RX ADMIN — OXYCODONE HYDROCHLORIDE 5 MILLIGRAM(S): 5 TABLET ORAL at 20:40

## 2024-01-04 RX ADMIN — OXYCODONE HYDROCHLORIDE 5 MILLIGRAM(S): 5 TABLET ORAL at 21:10

## 2024-01-04 NOTE — PATIENT PROFILE ADULT - FALL HARM RISK - HARM RISK INTERVENTIONS
Assistance with ambulation/Assistance OOB with selected safe patient handling equipment/Communicate Risk of Fall with Harm to all staff/Reinforce activity limits and safety measures with patient and family/Tailored Fall Risk Interventions/Visual Cue: Yellow wristband and red socks/Bed in lowest position, wheels locked, appropriate side rails in place/Call bell, personal items and telephone in reach/Instruct patient to call for assistance before getting out of bed or chair/Non-slip footwear when patient is out of bed/[place] to call system/Physically safe environment - no spills, clutter or unnecessary equipment/Purposeful Proactive Rounding/Room/bathroom lighting operational, light cord in reach Assistance with ambulation/Assistance OOB with selected safe patient handling equipment/Communicate Risk of Fall with Harm to all staff/Reinforce activity limits and safety measures with patient and family/Tailored Fall Risk Interventions/Visual Cue: Yellow wristband and red socks/Bed in lowest position, wheels locked, appropriate side rails in place/Call bell, personal items and telephone in reach/Instruct patient to call for assistance before getting out of bed or chair/Non-slip footwear when patient is out of bed/[place] to call system/Physically safe environment - no spills, clutter or unnecessary equipment/Purposeful Proactive Rounding/Room/bathroom lighting operational, light cord in reach

## 2024-01-04 NOTE — H&P ADULT - ASSESSMENT
68F pmhx HTN, HLD, DM (diet controlled), CKD, b/l knee OA presents with worsening b/l knee pain. Arthocentesis w/o evidence of gout or septic arthritis. Overall c/f worsening of OA vs. possible component of radiculopathy

## 2024-01-04 NOTE — H&P ADULT - TIME BILLING
Time-based billing (NON-critical care).     75 minutes spent on total encounter; more than 50% of the visit was spent counseling and / or coordinating care by the attending physician.  The necessity of the time spent during the encounter on this date of service was due to:     review of laboratory data, radiology results, consultants' recommendations, documentation in [place], discussion with patient/ACP and interdisciplinary staff (such as , social workers, etc). Interventions were performed as documented above. Time-based billing (NON-critical care).     75 minutes spent on total encounter; more than 50% of the visit was spent counseling and / or coordinating care by the attending physician.  The necessity of the time spent during the encounter on this date of service was due to:     review of laboratory data, radiology results, consultants' recommendations, documentation in [place], discussion with patient/ACP and interdisciplinary staff (such as , social workers, etc). Interventions were performed as documented above.

## 2024-01-04 NOTE — H&P ADULT - NSHPLABSRESULTS_GEN_ALL_CORE
LABS:                        11.5   6.85  )-----------( 218      ( 2024 02:37 )             35.3     01-    138  |  103  |  19  ----------------------------<  148<H>  4.0   |  26  |  1.25    Ca    10.5      2024 02:37    TPro  7.4  /  Alb  3.8  /  TBili  0.7  /  DBili  x   /  AST  18  /  ALT  15  /  AlkPhos  82  01-04          Urinalysis Basic - ( 2024 02:37 )    Color: Yellow / Appearance: Clear / S.012 / pH: x  Gluc: 148 mg/dL / Ketone: Negative mg/dL  / Bili: Negative / Urobili: 0.2 mg/dL   Blood: x / Protein: Trace mg/dL / Nitrite: Negative   Leuk Esterase: Trace / RBC: 0 /HPF / WBC 0 /HPF   Sq Epi: x / Non Sq Epi: 1 /HPF / Bacteria: Negative /HPF      I&O's Summary    BNP    RADIOLOGY & ADDITIONAL STUDIES:    TELEMETRY: reviewed    EKG: reviewed

## 2024-01-04 NOTE — H&P ADULT - HISTORY OF PRESENT ILLNESS
IN PROGRESS 68F pmhx HTN, HLD, DM (diet controlled), CKD, b/l knee OA presents with worsening b/l knee pain. At baseline, pt walks without assistance. Due to OA, she has started to need to "hop" to ambulate. However, over the past 4 months, her knee pain has worsened. She now has to hold onto walls and furniture to ambulate. Over the past few days she has not been able to ambulate at all due to pain, prompting her to come to the hospital. She describes the pain as occurring in b/l knees, L>R, with throbbing, sharp pain, rated 8-9/10. She also has concurrent back pain radiating down the posterior aspect of her L leg. She endorses swelling of both knees with some warmness. Endorses subjective fevers. No loss of sensation, motor strength. No fecal or urinary incontinence

## 2024-01-04 NOTE — H&P ADULT - NSHPPHYSICALEXAM_GEN_ALL_CORE
Improved
T(C): 36.7 (01-04-24 @ 05:00), Max: 36.7 (01-04-24 @ 05:00)  HR: 64 (01-04-24 @ 05:00) (64 - 83)  BP: 152/77 (01-04-24 @ 05:00) (152/77 - 160/96)  RR: 15 (01-04-24 @ 05:00) (15 - 15)  SpO2: 100% (01-04-24 @ 05:00) (100% - 100%)    GENERAL: Well-appearing, well-nourished, NAD  EYES: EOMI, no scleral icterus  NECK: No JVD, no nodules, no carotid bruits  LUNG: CTAB, no wheezes, no rhonchi, no accessory muscle use  HEART: RRR, no m/g/r  ABDOMEN: Soft, NT, ND, no masses appreciated  EXTREMITIES:  No BLE edema, 2+ b/l AT and DP pulses  MSK: b/l knee with mild swelling, no erythema or warmth. R knee full active and passive ROM. L knee with limited active ROM (<30degrees), passive ROM limited by pain (<30 deg)  NEUROLOGY: non-focal, b/l LE sensation intact, 5/5 dorsiflexion, plantarflexion. Unable to assess knee extension/flexion 2/2 limited by pain  SKIN: No rashes or lesions

## 2024-01-04 NOTE — H&P ADULT - PROBLEM SELECTOR PLAN 2
- back pain radiating down posterior L leg  - given h/o spinal fusion, likely represents lumbar radiculopathy  - pain regimen as above  - PT eval

## 2024-01-04 NOTE — H&P ADULT - NSHPSOCIALHISTORY_GEN_ALL_CORE
Denies alcohol use, smoking, recreational drug use. Never smoker. Lives at home with daughter. 2 other children live in [place] Denies alcohol use, smoking, recreational drug use. Never smoker. Lives at home with daughter. 2 other children live in [place]. Works in a nursing home

## 2024-01-04 NOTE — PHARMACOTHERAPY INTERVENTION NOTE - COMMENTS
Medication history is complete. Medication list obtained from patient and confirmed with [facility] [phone]. Medication list updated in Outpatient Medication Record (OMR). Please call spectra [id number] if you have any questions.   Of note:  -Pt states she takes atorvastatin 40 mg, however pharmacy had no record of 40 mg being filled. Last filled atorvastatin 80mg July 2023.  -Pt states she takes Vitamin A, B, C, and D however unsure of strength. Medication history is complete. Medication list obtained from patient and confirmed with [facility] [phone]. Medication list updated in Outpatient Medication Record (OMR). Please call spectra [id number] if you have any questions.   Of note:  -Pt states she takes atorvastatin 40 mg, however pharmacy had no record of 40 mg being filled. Last filled atorvastatin 80mg July 2023.  -Pt states she takes Vitamin A, B, C, and D however unsure of strength.

## 2024-01-04 NOTE — CHART NOTE - NSCHARTNOTEFT_GEN_A_CORE
Confidential Drug Utilization Report  Search Terms: Sona Bonner, 1955Search Date: 01/04/2024 13:02:05 PM  Searching on behalf of: [id number] - [facility]  The Drug Utilization Report below displays all of the controlled substance prescriptions, if any, that your patient has filled in the last twelve months. The information displayed on this report is compiled from pharmacy submissions to the Department, and accurately reflects the information as submitted by the pharmacies.    This report was requested by: Vidal Mir | Reference #: [id number]    There are no results for the search terms that you entered. Confidential Drug Utilization Report  Search Terms: Sona Bonner, 1955Search Date: 01/04/2024 13:02:05 PM  Searching on behalf of: 0541 - [facility]  The Drug Utilization Report below displays all of the controlled substance prescriptions, if any, that your patient has filled in the last twelve months. The information displayed on this report is compiled from pharmacy submissions to the Department, and accurately reflects the information as submitted by the pharmacies.    This report was requested by: Vidal Mir | Reference #: [id number]    There are no results for the search terms that you entered.

## 2024-01-04 NOTE — H&P ADULT - NSICDXPASTMEDICALHX_GEN_ALL_CORE_FT
PAST MEDICAL HISTORY:  CAD (Coronary Artery Disease)     Cardiomegaly     Diabetes mellitus     Fibroids     Herniated Disc Lumbar    HTN (Hypertension)     Hyperlipidemia     Obese     Sickle Cell Trait

## 2024-01-04 NOTE — PROCEDURE NOTE - SUPERVISORY STATEMENT
I have personally discussed the indications, risks and benefits of the above procedure with the ACP. I was present for key portions of the procedure and assisted when required. John Alvarez, ED Attending

## 2024-01-04 NOTE — H&P ADULT - ATTENDING COMMENTS
69 yo F with PMH HTN, HLD, DM (diet controlled), CKD, b/l knee OA presents with acute on chronic b/l knee pain.     #Osteoarthritis   - B/l knee xrays completed, no acute fracture or dislocation. Bilateral knee joint arthrosis.  - Arthocentesis likely c/w with mild inflammatory disease w/o evidence of gout or septic arthritis.  - pain regimen with tylenol and oxy   - PT eval    #Sciatica   - Also has back pain that radiates down L posterior leg   - Noted history of spinal fusion  - pain control as above   - PT eval     Rest as above   Discussed with HS 67 yo F with PMH HTN, HLD, DM (diet controlled), CKD, b/l knee OA presents with acute on chronic b/l knee pain.     #Osteoarthritis   - B/l knee xrays completed, no acute fracture or dislocation. Bilateral knee joint arthrosis.  - Arthocentesis likely c/w with mild inflammatory disease w/o evidence of gout or septic arthritis.  - pain regimen with tylenol and oxy   - PT eval    #Sciatica   - Also has back pain that radiates down L posterior leg   - Noted history of spinal fusion  - pain control as above   - PT eval     Rest as above   Discussed with HS

## 2024-01-04 NOTE — H&P ADULT - PROBLEM SELECTOR PLAN 1
- now has difficulty ambulating  - arthrocentesis negative for gout and septic arthritis  - suspect worsening of OA  - - now has difficulty ambulating  - arthrocentesis negative for gout and septic arthritis  - suspect worsening of OA  - pain regimen: mild - tylenol 650mg q6h, moderate - oxy 2.5mg q4h, severe - oxy 5mg q4h  - PT dell

## 2024-01-05 ENCOUNTER — TRANSCRIPTION ENCOUNTER (OUTPATIENT)
Age: 69
End: 2024-01-05

## 2024-01-05 DIAGNOSIS — E11.65 TYPE 2 DIABETES MELLITUS WITH HYPERGLYCEMIA: ICD-10-CM

## 2024-01-05 LAB
ANION GAP SERPL CALC-SCNC: 8 MMOL/L — SIGNIFICANT CHANGE UP (ref 7–14)
ANION GAP SERPL CALC-SCNC: 8 MMOL/L — SIGNIFICANT CHANGE UP (ref 7–14)
BUN SERPL-MCNC: 21 MG/DL — SIGNIFICANT CHANGE UP (ref 7–23)
BUN SERPL-MCNC: 21 MG/DL — SIGNIFICANT CHANGE UP (ref 7–23)
CALCIUM SERPL-MCNC: 10.4 MG/DL — SIGNIFICANT CHANGE UP (ref 8.4–10.5)
CALCIUM SERPL-MCNC: 10.4 MG/DL — SIGNIFICANT CHANGE UP (ref 8.4–10.5)
CHLORIDE SERPL-SCNC: 103 MMOL/L — SIGNIFICANT CHANGE UP (ref 98–107)
CHLORIDE SERPL-SCNC: 103 MMOL/L — SIGNIFICANT CHANGE UP (ref 98–107)
CO2 SERPL-SCNC: 26 MMOL/L — SIGNIFICANT CHANGE UP (ref 22–31)
CO2 SERPL-SCNC: 26 MMOL/L — SIGNIFICANT CHANGE UP (ref 22–31)
CREAT SERPL-MCNC: 1.15 MG/DL — SIGNIFICANT CHANGE UP (ref 0.5–1.3)
CREAT SERPL-MCNC: 1.15 MG/DL — SIGNIFICANT CHANGE UP (ref 0.5–1.3)
CULTURE RESULTS: SIGNIFICANT CHANGE UP
CULTURE RESULTS: SIGNIFICANT CHANGE UP
EGFR: 53 ML/MIN/1.73M2 — LOW
EGFR: 53 ML/MIN/1.73M2 — LOW
GLUCOSE BLDC GLUCOMTR-MCNC: 174 MG/DL — HIGH (ref 70–99)
GLUCOSE BLDC GLUCOMTR-MCNC: 174 MG/DL — HIGH (ref 70–99)
GLUCOSE BLDC GLUCOMTR-MCNC: 188 MG/DL — HIGH (ref 70–99)
GLUCOSE BLDC GLUCOMTR-MCNC: 188 MG/DL — HIGH (ref 70–99)
GLUCOSE BLDC GLUCOMTR-MCNC: 222 MG/DL — HIGH (ref 70–99)
GLUCOSE BLDC GLUCOMTR-MCNC: 222 MG/DL — HIGH (ref 70–99)
GLUCOSE BLDC GLUCOMTR-MCNC: 258 MG/DL — HIGH (ref 70–99)
GLUCOSE BLDC GLUCOMTR-MCNC: 258 MG/DL — HIGH (ref 70–99)
GLUCOSE SERPL-MCNC: 168 MG/DL — HIGH (ref 70–99)
GLUCOSE SERPL-MCNC: 168 MG/DL — HIGH (ref 70–99)
MAGNESIUM SERPL-MCNC: 2 MG/DL — SIGNIFICANT CHANGE UP (ref 1.6–2.6)
MAGNESIUM SERPL-MCNC: 2 MG/DL — SIGNIFICANT CHANGE UP (ref 1.6–2.6)
PHOSPHATE SERPL-MCNC: 3.3 MG/DL — SIGNIFICANT CHANGE UP (ref 2.5–4.5)
PHOSPHATE SERPL-MCNC: 3.3 MG/DL — SIGNIFICANT CHANGE UP (ref 2.5–4.5)
POTASSIUM SERPL-MCNC: 4.1 MMOL/L — SIGNIFICANT CHANGE UP (ref 3.5–5.3)
POTASSIUM SERPL-MCNC: 4.1 MMOL/L — SIGNIFICANT CHANGE UP (ref 3.5–5.3)
POTASSIUM SERPL-SCNC: 4.1 MMOL/L — SIGNIFICANT CHANGE UP (ref 3.5–5.3)
POTASSIUM SERPL-SCNC: 4.1 MMOL/L — SIGNIFICANT CHANGE UP (ref 3.5–5.3)
SODIUM SERPL-SCNC: 137 MMOL/L — SIGNIFICANT CHANGE UP (ref 135–145)
SODIUM SERPL-SCNC: 137 MMOL/L — SIGNIFICANT CHANGE UP (ref 135–145)
SPECIMEN SOURCE: SIGNIFICANT CHANGE UP
SPECIMEN SOURCE: SIGNIFICANT CHANGE UP

## 2024-01-05 PROCEDURE — 99232 SBSQ HOSP IP/OBS MODERATE 35: CPT | Mod: GC

## 2024-01-05 PROCEDURE — 99222 1ST HOSP IP/OBS MODERATE 55: CPT | Mod: GC

## 2024-01-05 RX ORDER — DAPAGLIFLOZIN 10 MG/1
1 TABLET, FILM COATED ORAL
Qty: 30 | Refills: 0
Start: 2024-01-05 | End: 2024-02-03

## 2024-01-05 RX ORDER — LINAGLIPTIN 5 MG/1
1 TABLET, FILM COATED ORAL
Qty: 30 | Refills: 0
Start: 2024-01-05 | End: 2024-02-03

## 2024-01-05 RX ORDER — SEMAGLUTIDE 0.68 MG/ML
0.25 INJECTION, SOLUTION SUBCUTANEOUS
Qty: 4 | Refills: 0
Start: 2024-01-05 | End: 2024-02-03

## 2024-01-05 RX ORDER — SITAGLIPTIN 50 MG/1
1 TABLET, FILM COATED ORAL
Qty: 30 | Refills: 0
Start: 2024-01-05 | End: 2024-02-03

## 2024-01-05 RX ORDER — DULAGLUTIDE 4.5 MG/.5ML
0.75 INJECTION, SOLUTION SUBCUTANEOUS
Qty: 4 | Refills: 0
Start: 2024-01-05 | End: 2024-02-03

## 2024-01-05 RX ORDER — PIOGLITAZONE HYDROCHLORIDE 15 MG/1
1 TABLET ORAL
Qty: 30 | Refills: 0
Start: 2024-01-05 | End: 2024-02-03

## 2024-01-05 RX ORDER — ISOPROPYL ALCOHOL, BENZOCAINE .7; .06 ML/ML; ML/ML
0 SWAB TOPICAL
Qty: 100 | Refills: 1
Start: 2024-01-05

## 2024-01-05 RX ORDER — EMPAGLIFLOZIN 10 MG/1
1 TABLET, FILM COATED ORAL
Qty: 30 | Refills: 0
Start: 2024-01-05 | End: 2024-02-03

## 2024-01-05 RX ORDER — TIRZEPATIDE 15 MG/.5ML
2.5 INJECTION, SOLUTION SUBCUTANEOUS
Qty: 1 | Refills: 0
Start: 2024-01-05 | End: 2024-02-03

## 2024-01-05 RX ORDER — LISINOPRIL 2.5 MG/1
20 TABLET ORAL DAILY
Refills: 0 | Status: DISCONTINUED | OUTPATIENT
Start: 2024-01-06 | End: 2024-01-06

## 2024-01-05 RX ORDER — SEMAGLUTIDE 0.68 MG/ML
0.25 INJECTION, SOLUTION SUBCUTANEOUS
Qty: 1 | Refills: 0
Start: 2024-01-05 | End: 2024-02-03

## 2024-01-05 RX ORDER — INSULIN GLARGINE 100 [IU]/ML
10 INJECTION, SOLUTION SUBCUTANEOUS AT BEDTIME
Refills: 0 | Status: DISCONTINUED | OUTPATIENT
Start: 2024-01-05 | End: 2024-01-06

## 2024-01-05 RX ORDER — INSULIN LISPRO 100/ML
3 VIAL (ML) SUBCUTANEOUS
Refills: 0 | Status: DISCONTINUED | OUTPATIENT
Start: 2024-01-05 | End: 2024-01-06

## 2024-01-05 RX ADMIN — Medication 1: at 18:35

## 2024-01-05 RX ADMIN — SENNA PLUS 2 TABLET(S): 8.6 TABLET ORAL at 22:29

## 2024-01-05 RX ADMIN — INSULIN GLARGINE 10 UNIT(S): 100 INJECTION, SOLUTION SUBCUTANEOUS at 22:28

## 2024-01-05 RX ADMIN — HEPARIN SODIUM 5000 UNIT(S): 5000 INJECTION INTRAVENOUS; SUBCUTANEOUS at 22:29

## 2024-01-05 RX ADMIN — Medication 3: at 12:48

## 2024-01-05 RX ADMIN — LISINOPRIL 20 MILLIGRAM(S): 2.5 TABLET ORAL at 05:48

## 2024-01-05 RX ADMIN — HEPARIN SODIUM 5000 UNIT(S): 5000 INJECTION INTRAVENOUS; SUBCUTANEOUS at 05:48

## 2024-01-05 RX ADMIN — Medication 1 TABLET(S): at 12:44

## 2024-01-05 RX ADMIN — ATORVASTATIN CALCIUM 40 MILLIGRAM(S): 80 TABLET, FILM COATED ORAL at 22:29

## 2024-01-05 RX ADMIN — Medication 3 UNIT(S): at 18:35

## 2024-01-05 RX ADMIN — Medication 81 MILLIGRAM(S): at 12:43

## 2024-01-05 RX ADMIN — HEPARIN SODIUM 5000 UNIT(S): 5000 INJECTION INTRAVENOUS; SUBCUTANEOUS at 12:44

## 2024-01-05 RX ADMIN — Medication 1: at 09:26

## 2024-01-05 NOTE — DISCHARGE NOTE PROVIDER - NSDCMRMEDTOKEN_GEN_ALL_CORE_FT
Aspir 81 oral delayed release tablet: 1 tab(s) orally once a day  atorvastatin 40 mg oral tablet: 1 tab(s) orally once a day (at bedtime) (pt states she&#x27;s taking atorvastatin 40 mg, however pharmacy last filled atorvastation 80 mg 7/23 for 90 day supply. no current record of 40 mg)  lisinopril 20 mg oral tablet: 1 tab(s) orally once a day  senna (sennosides) 8.6 mg oral tablet: 1 tab(s) orally as needed  Vitamin A: (unsure of strength and frequency)  Vitamin B: (unsure of strength and frequency)  Vitamin C: (unsure of strength and frequency)  Vitamin D: (unsure of strength and frequency)   Aspir 81 oral delayed release tablet: 1 tab(s) orally once a day  atorvastatin 40 mg oral tablet: 1 tab(s) orally once a day (at bedtime) (pt states she&#x27;s taking atorvastatin 40 mg, however pharmacy last filled atorvastation 80 mg 7/23 for 90 day supply. no current record of 40 mg)  lisinopril 20 mg oral tablet: 1 tab(s) orally once a day  Rolling Walker: ICD M62.81/M17.0  senna (sennosides) 8.6 mg oral tablet: 1 tab(s) orally as needed  Vitamin A: (unsure of strength and frequency)  Vitamin B: (unsure of strength and frequency)  Vitamin C: (unsure of strength and frequency)  Vitamin D: (unsure of strength and frequency)   Aspir 81 oral delayed release tablet: 1 tab(s) orally once a day  atorvastatin 40 mg oral tablet: 1 tab(s) orally once a day (at bedtime) (pt states she&#x27;s taking atorvastatin 40 mg, however pharmacy last filled atorvastation 80 mg 7/23 for 90 day supply. no current record of 40 mg)  Farxiga 5 mg oral tablet: 1 tab(s) orally once a day  Januvia 100 mg oral tablet: 1 tab(s) orally once a day  Jardiance 10 mg oral tablet: 1 tab(s) orally once a day  lisinopril 20 mg oral tablet: 1 tab(s) orally once a day  Mounjaro 10 mg/0.5 mL subcutaneous solution: 2.5 milligram(s) subcutaneously once a week  Ozempic 2 mg/3 mL (0.25 mg or 0.5 mg dose) subcutaneous solution: 0.25 milligram(s) subcutaneously once a week  pioglitazone 15 mg oral tablet: 1 tab(s) orally once a day  Rolling Walker: ICD M62.81/M17.0  semaglutide 0.25 mg/0.5 mL (0.25 mg dose) subcutaneous solution: 0.25 milligram(s) subcutaneously once a week  senna (sennosides) 8.6 mg oral tablet: 1 tab(s) orally as needed  Tradjenta 5 mg oral tablet: 1 tab(s) orally once a day  Trulicity Pen 0.75 mg/0.5 mL subcutaneous solution: 0.75 milligram(s) subcutaneously once a week  Vitamin A: (unsure of strength and frequency)  Vitamin B: (unsure of strength and frequency)  Vitamin C: (unsure of strength and frequency)  Vitamin D: (unsure of strength and frequency)   acetaminophen 325 mg oral tablet: 2 tab(s) orally every 6 hours as needed for  pain MDD: 3000mg  alcohol swabs: Apply topically to affected area 4 times a day  Aspir 81 oral delayed release tablet: 1 tab(s) orally once a day  atorvastatin 40 mg oral tablet: 1 tab(s) orally once a day (at bedtime)  Farxiga 5 mg oral tablet: 1 tab(s) orally once a day  glucometer (per patient&#x27;s insurance): Test blood sugars four times a day. Dispense #1 glucometer.  lancets: 1 application subcutaneously 4 times a day  lidocaine 4% topical film: Apply topically to affected area once a day as needed for  pain Apply patch for 12 hours, then remove patch for 12 hours. Apply to affected areas  lisinopril 20 mg oral tablet: 1 tab(s) orally once a day  Multiple Vitamins oral tablet: 1 tab(s) orally once a day  naloxone 4 mg/0.1 mL nasal spray: 4 milligram(s) intranasally once as needed for  opioid overdose - sedation, decreased breathing For opioid overdose - sedation, decreased breathing. After administering a spray, call 911 immediately  oxyCODONE 5 mg oral tablet: 1 tab(s) orally every 6 hours as needed for  severe pain MDD: 4 tablets (20mg)  polyethylene glycol 3350 oral powder for reconstitution: 17 gram(s) orally once a day as needed for  constipation  Rolling Walker: ICD M62.81/M17.0  senna (sennosides) 8.6 mg oral tablet: 1 tab(s) orally once a day as needed for  constipation  Vitamin A: (unsure of strength and frequency)  Vitamin B: (unsure of strength and frequency)  Vitamin C: (unsure of strength and frequency)  Vitamin D: (unsure of strength and frequency)   acetaminophen 325 mg oral tablet: 2 tab(s) orally every 6 hours as needed for  pain MDD: 3000mg  alcohol swabs: Apply topically to affected area 4 times a day  Aspir 81 oral delayed release tablet: 1 tab(s) orally once a day  atorvastatin 40 mg oral tablet: 1 tab(s) orally once a day (at bedtime)  glucometer (per patient&#x27;s insurance): Test blood sugars four times a day. Dispense #1 glucometer.  lancets: 1 application subcutaneously 4 times a day  lisinopril 20 mg oral tablet: 1 tab(s) orally once a day  metFORMIN 500 mg oral tablet, extended release: 1 tab(s) orally 2 times a day  senna (sennosides) 8.6 mg oral tablet: 1 tab(s) orally once a day as needed for  constipation  Vitamin A: (unsure of strength and frequency)  Vitamin B: (unsure of strength and frequency)  Vitamin C: (unsure of strength and frequency)  Vitamin D: (unsure of strength and frequency)

## 2024-01-05 NOTE — PROGRESS NOTE ADULT - PROBLEM SELECTOR PLAN 4
- diet controlled  - FS/ELIZABETH  - can consider DC if FS are well controlled - reports diet controlled; stopped diabetes meds  - A1c 1/4/24 10.1%  - FS/ELIZABETH  - endo consult for uncontrolled DM  - will need to assess insulin requirements after she starts accepting the insulin here

## 2024-01-05 NOTE — DISCHARGE NOTE NURSING/CASE MANAGEMENT/SOCIAL WORK - NSSCTYPOFSERV_GEN_ALL_CORE
RN/PT services.  Nurse to visit 24-48 hours from discharge. Nurse to call prior to arrange visit. Physical therapy to follow. SN/PT services

## 2024-01-05 NOTE — CONSULT NOTE ADULT - ASSESSMENT
68F pmhx HTN, HLD, T2DM, CKD, b/l knee OA presents with worsening b/l knee pain. Endocrinology consulted for management of T2DM.    Poorly controlled T2DM with hyperglycemia  - HbA1c: 10.1  - Home Regimen:   - Endocrinologist:  PLAN  - Hold oral DM agents while inpatient  - Start Lantus  units at bedtime. DO NOT HOLD IF NPO.  - Start Admelog  units TID pre-meal. HOLD IF NPO.  - Use moderate/Use low dose Admelog correction scale pre-meal  - Use moderate/Use low dose Admelog correction scale at bedtime  - Fingerstick BG before meals and bedtime  - Goal -180  - Carbohydrate consistent diet  - RD consult  Discharge plan:  - Discharge medications: ************************  - Patient to call doctor with persistent high or low BG at home.   - Ensure patient has glucometer, test strips and lancets on discharge.  - Recommend routine outpatient ophthalmology, podiatry and endocrinology f/u    HTN  - Home regimen: lisinopril 20mg daily  PLAN  - Can check urine microalbumin outpatient  - Outpatient goal BP <130/80. Management per primary team.    HLD  - Home regimen: atorvastatin 40mg daily  PLAN  - Continue atorvastatin 40mg daily per primary team  - Can check lipid profile if not done recently    Discussed with primary team.    Gurinder Rice MD, Endocrinology Fellow  Pager [phone] from 9am to 5pm. After hours and on weekends, please call [phone].   68F pmhx HTN, HLD, T2DM, CKD, b/l knee OA presents with worsening b/l knee pain. Endocrinology consulted for management of T2DM.    Poorly controlled T2DM with hyperglycemia  - HbA1c: 10.1  - Home Regimen:   - Endocrinologist:  PLAN  - Hold oral DM agents while inpatient  - Start Lantus  units at bedtime. DO NOT HOLD IF NPO.  - Start Admelog  units TID pre-meal. HOLD IF NPO.  - Use moderate/Use low dose Admelog correction scale pre-meal  - Use moderate/Use low dose Admelog correction scale at bedtime  - Fingerstick BG before meals and bedtime  - Goal -180  - Carbohydrate consistent diet  - RD consult  Discharge plan:  - Discharge medications: ************************  - Patient to call doctor with persistent high or low BG at home.   - Ensure patient has glucometer, test strips and lancets on discharge.  - Recommend routine outpatient ophthalmology, podiatry and endocrinology f/u    HTN  - Home regimen: lisinopril 20mg daily  PLAN  - Can check urine microalbumin outpatient  - Outpatient goal BP <130/80. Management per primary team.    HLD  - Home regimen: atorvastatin 40mg daily  PLAN  - Continue atorvastatin 40mg daily per primary team  - Can check lipid profile if not done recently    Discussed with primary team.    Gurinder Rice MD, Endocrinology Fellow  Pager [phone] from 9am to 5pm. After hours and on weekends, please call [phone].   68F pmhx HTN, HLD, T2DM, CKD, b/l knee OA presents with worsening b/l knee pain. Endocrinology consulted for management of T2DM.    Poorly controlled T2DM with hyperglycemia  - HbA1c: 10.1  - Home Regimen: not on medication  - Endocrinologist: does not have  PLAN  - Start Lantus  units at bedtime. DO NOT HOLD IF NPO.  - Start Admelog  units TID pre-meal. HOLD IF NPO.  - Use moderate/Use low dose Admelog correction scale pre-meal  - Use moderate/Use low dose Admelog correction scale at bedtime  - Fingerstick BG before meals and bedtime  - Goal -180  - Carbohydrate consistent diet  - RD consult  Discharge plan:  - Discharge medications: ************************  - Patient to call doctor with persistent high or low BG at home.   - Ensure patient has glucometer, test strips and lancets on discharge.  - Recommend routine outpatient ophthalmology, podiatry and endocrinology f/u    HTN  - Home regimen: lisinopril 20mg daily  PLAN  - Can check urine microalbumin outpatient  - Outpatient goal BP <130/80. Management per primary team.    HLD  - Home regimen: atorvastatin 40mg daily  PLAN  - Continue atorvastatin 40mg daily per primary team  - Can check lipid profile if not done recently    Discussed with primary team.    Gurinder Rice MD, Endocrinology Fellow  Pager [phone] from 9am to 5pm. After hours and on weekends, please call [phone].   68F pmhx HTN, HLD, T2DM, CKD, b/l knee OA presents with worsening b/l knee pain. Endocrinology consulted for management of T2DM.    Poorly controlled T2DM with hyperglycemia  - HbA1c: 10.1  - Home Regimen: not on medication  - Endocrinologist: does not have  PLAN  - Start Lantus  units at bedtime. DO NOT HOLD IF NPO.  - Start Admelog  units TID pre-meal. HOLD IF NPO.  - Use moderate/Use low dose Admelog correction scale pre-meal  - Use moderate/Use low dose Admelog correction scale at bedtime  - Fingerstick BG before meals and bedtime  - Goal -180  - Carbohydrate consistent diet  - RD consult  Discharge plan:  - Discharge medications: ************************  - Patient to call doctor with persistent high or low BG at home.   - Ensure patient has glucometer, test strips and lancets on discharge.  - Recommend routine outpatient ophthalmology, podiatry and endocrinology f/u    HTN  - Home regimen: lisinopril 20mg daily  PLAN  - Can check urine microalbumin outpatient  - Outpatient goal BP <130/80. Management per primary team.    HLD  - Home regimen: atorvastatin 40mg daily  PLAN  - Continue atorvastatin 40mg daily per primary team  - Can check lipid profile if not done recently    Discussed with primary team.    Gurinder Rice MD, Endocrinology Fellow  Pager [phone] from 9am to 5pm. After hours and on weekends, please call [phone].   68F pmhx HTN, HLD, T2DM, CKD, b/l knee OA presents with worsening b/l knee pain. Endocrinology consulted for management of T2DM.    Poorly controlled T2DM with hyperglycemia  - HbA1c: 10.1  - Home Regimen: not on medication  - Endocrinologist: does not have  PLAN  - Start Lantus  units at bedtime. DO NOT HOLD IF NPO.  - Start Admelog  units TID pre-meal. HOLD IF NPO.  - Use moderate/Use low dose Admelog correction scale pre-meal  - Use moderate/Use low dose Admelog correction scale at bedtime  - Fingerstick BG before meals and bedtime  - Goal -180  - Carbohydrate consistent diet  - RD consult  Discharge plan:  - Discharge medications: Patient does not want metformin or insulin, does not want a glucometer as she does not like doing fingersticks  - Patient to call doctor with persistent high or low BG at home.   - Ensure patient has glucometer, test strips and lancets on discharge.  - Recommend routine outpatient ophthalmology, podiatry and endocrinology f/u    HTN  - Home regimen: lisinopril 20mg daily  PLAN  - Can check urine microalbumin outpatient  - Outpatient goal BP <130/80. Management per primary team.    HLD  - Home regimen: atorvastatin 40mg daily  PLAN  - Continue atorvastatin 40mg daily per primary team  - Can check lipid profile if not done recently    Discussed with primary team.    Gurinder Rice MD, Endocrinology Fellow  Pager [phone] from 9am to 5pm. After hours and on weekends, please call [phone].   68F pmhx HTN, HLD, T2DM, CKD, b/l knee OA presents with worsening b/l knee pain. Endocrinology consulted for management of T2DM.    Poorly controlled T2DM with hyperglycemia  - HbA1c: 10.1  - Home Regimen: not on medication  - Endocrinologist: does not have  PLAN  - Start Lantus  units at bedtime. DO NOT HOLD IF NPO.  - Start Admelog  units TID pre-meal. HOLD IF NPO.  - Use moderate/Use low dose Admelog correction scale pre-meal  - Use moderate/Use low dose Admelog correction scale at bedtime  - Fingerstick BG before meals and bedtime  - Goal -180  - Carbohydrate consistent diet  - RD consult  Discharge plan:  - Discharge medications: Patient does not want metformin or insulin, does not want a glucometer as she does not like doing fingersticks  - Patient to call doctor with persistent high or low BG at home.   - Ensure patient has glucometer, test strips and lancets on discharge.  - Recommend routine outpatient ophthalmology, podiatry and endocrinology f/u    HTN  - Home regimen: lisinopril 20mg daily  PLAN  - Can check urine microalbumin outpatient  - Outpatient goal BP <130/80. Management per primary team.    HLD  - Home regimen: atorvastatin 40mg daily  PLAN  - Continue atorvastatin 40mg daily per primary team  - Can check lipid profile if not done recently    Discussed with primary team.    Gurinder Rice MD, Endocrinology Fellow  Pager [phone] from 9am to 5pm. After hours and on weekends, please call [phone].   68F pmhx HTN, HLD, T2DM, CKD, b/l knee OA presents with worsening b/l knee pain. Endocrinology consulted for management of T2DM.    Poorly controlled T2DM with hyperglycemia  - HbA1c: 10.1  - Home Regimen: not on medication  - Endocrinologist: does not have  PLAN  - Start Lantus 10 units at bedtime. DO NOT HOLD IF NPO.  - Start Admelog 3 units TID pre-meal. HOLD IF NPO.  - Use low dose Admelog correction scale pre-meal  - Use low dose Admelog correction scale at bedtime  - Fingerstick BG before meals and bedtime  - hypoglycemia protocol prn  - Goal -180  - Carbohydrate consistent diet  - RD consult  Discharge plan:  - Discharge medications: Patient does not want metformin or insulin on discharge and has a sulfa allergy. Patient will need at least 2 agents. Please price check the following: SGLT-2 inhibitors (farxiga 5mg daily or jardiance 10mg daily), GLP1 agonist (ozempic 0.25mg once weekly, mounjaro 2.5mg once weekly, trulicity 0.75mg once weekly), pioglitazone 15mg once daily, DPP-4 inhibitor (januvia 100mg daily (pending GFR), tradjenta 5mg daily). Based on coverage will pick 2 agents. If only one of those agents available, will need to see if can use prandin given sulfa allergy or not.  - Patient to call doctor with persistent high or low BG at home.   - Ensure patient has glucometer, test strips and lancets on discharge.  - Recommend routine outpatient ophthalmology, podiatry and endocrinology f/u    HTN  - Home regimen: lisinopril 20mg daily  PLAN  - Can check urine microalbumin outpatient  - Outpatient goal BP <130/80. Management per primary team.    HLD  - Home regimen: atorvastatin 40mg daily  PLAN  - Continue atorvastatin 40mg daily per primary team  - Can check lipid profile if not done recently    Discussed with primary team.    Gurinder Rice MD, Endocrinology Fellow  Pager [phone] from 9am to 5pm. After hours and on weekends, please call [phone].   68F pmhx HTN, HLD, T2DM, CKD, b/l knee OA presents with worsening b/l knee pain. Endocrinology consulted for management of T2DM.    Poorly controlled T2DM with hyperglycemia  - HbA1c: 10.1  - Home Regimen: not on medication  - Endocrinologist: does not have  PLAN  - Start Lantus 10 units at bedtime. DO NOT HOLD IF NPO.  - Start Admelog 3 units TID pre-meal. HOLD IF NPO.  - Use low dose Admelog correction scale pre-meal  - Use low dose Admelog correction scale at bedtime  - Fingerstick BG before meals and bedtime  - hypoglycemia protocol prn  - Goal -180  - Carbohydrate consistent diet  - RD consult  Discharge plan:  - Discharge medications: Patient does not want metformin or insulin on discharge and has a sulfa allergy. Patient will need at least 2 agents. Please price check the following: SGLT-2 inhibitors (farxiga 5mg daily or jardiance 10mg daily), GLP1 agonist (ozempic 0.25mg once weekly, mounjaro 2.5mg once weekly, trulicity 0.75mg once weekly), pioglitazone 15mg once daily, DPP-4 inhibitor (januvia 100mg daily (pending GFR), tradjenta 5mg daily). Based on coverage will pick 2 agents. If only one of those agents available, will need to see if can use prandin given sulfa allergy or not.  - Patient to call doctor with persistent high or low BG at home.   - Ensure patient has glucometer, test strips and lancets on discharge.  - Recommend routine outpatient ophthalmology, podiatry and endocrinology f/u    HTN  - Home regimen: lisinopril 20mg daily  PLAN  - Can check urine microalbumin outpatient  - Outpatient goal BP <130/80. Management per primary team.    HLD  - Home regimen: atorvastatin 40mg daily  PLAN  - Continue atorvastatin 40mg daily per primary team  - Can check lipid profile if not done recently    Discussed with primary team.    Gurinder Rice MD, Endocrinology Fellow  Pager [phone] from 9am to 5pm. After hours and on weekends, please call [phone].   68F pmhx HTN, HLD, T2DM, CKD, b/l knee OA presents with worsening b/l knee pain. Endocrinology consulted for management of T2DM.    Poorly controlled T2DM with hyperglycemia  - HbA1c: 10.1  - Home Regimen: not on medication  - Endocrinologist: does not have  PLAN  - Start Lantus 10 units at bedtime. DO NOT HOLD IF NPO.  - Start Admelog 3 units TID pre-meal. HOLD IF NPO.  - Use low dose Admelog correction scale pre-meal  - Use low dose Admelog correction scale at bedtime  - Fingerstick BG before meals and bedtime  - hypoglycemia protocol prn  - Goal -180  - Carbohydrate consistent diet  - RD consult  Discharge plan:  - Discharge medications: Patient does not want metformin or insulin on discharge and has a sulfa allergy. Patient will need at least 2 agents. Please price check the following: SGLT-2 inhibitors (farxiga 5mg daily or jardiance 10mg daily), GLP1 agonist (ozempic 0.25mg once weekly, mounjaro 2.5mg once weekly, trulicity 0.75mg once weekly), pioglitazone 15mg once daily, DPP-4 inhibitor (januvia 100mg daily (pending GFR), tradjenta 5mg daily). Based on coverage will pick 2 agents. If only one of those agents available, will need to see if can use prandin given sulfa allergy or not.  - Patient to call doctor with persistent high or low BG at home.   - Ensure patient has glucometer, test strips and lancets on discharge.  - Recommend routine outpatient ophthalmology, podiatry and endocrinology f/u. Patient reports she does not want endocrinology follow up and will follow up with PCP.    HTN  - Home regimen: lisinopril 20mg daily  PLAN  - Can check urine microalbumin outpatient  - Outpatient goal BP <130/80. Management per primary team.    HLD  - Home regimen: atorvastatin 40mg daily  PLAN  - Continue atorvastatin 40mg daily per primary team  - Can check lipid profile if not done recently    Discussed with primary team.    Gurinder Rice MD, Endocrinology Fellow  Pager [phone] from 9am to 5pm. After hours and on weekends, please call [phone].   68F pmhx HTN, HLD, T2DM, CKD, b/l knee OA presents with worsening b/l knee pain. Endocrinology consulted for management of T2DM.    Poorly controlled T2DM with hyperglycemia  - HbA1c: 10.1  - Home Regimen: not on medication  - Endocrinologist: does not have  PLAN  - Start Lantus 10 units at bedtime. DO NOT HOLD IF NPO.  - Start Admelog 3 units TID pre-meal. HOLD IF NPO.  - Use low dose Admelog correction scale pre-meal  - Use low dose Admelog correction scale at bedtime  - Fingerstick BG before meals and bedtime  - hypoglycemia protocol prn  - Goal -180  - Carbohydrate consistent diet  - RD consult  Discharge plan:  - Discharge medications: Patient does not want metformin or insulin on discharge and has a sulfa allergy. Patient will need at least 2 agents. Please price check the following: SGLT-2 inhibitors (farxiga 5mg daily or jardiance 10mg daily), GLP1 agonist (ozempic 0.25mg once weekly, mounjaro 2.5mg once weekly, trulicity 0.75mg once weekly), pioglitazone 15mg once daily, DPP-4 inhibitor (januvia 100mg daily (pending GFR), tradjenta 5mg daily). Based on coverage will pick 2 agents. If only one of those agents available, will need to see if can use prandin given sulfa allergy or not.  - Patient to call doctor with persistent high or low BG at home.   - Ensure patient has glucometer, test strips and lancets on discharge.  - Recommend routine outpatient ophthalmology, podiatry and endocrinology f/u. Patient reports she does not want endocrinology follow up and will follow up with PCP.    HTN  - Home regimen: lisinopril 20mg daily  PLAN  - Can check urine microalbumin outpatient  - Outpatient goal BP <130/80. Management per primary team.    HLD  - Home regimen: atorvastatin 40mg daily  PLAN  - Continue atorvastatin 40mg daily per primary team  - Can check lipid profile if not done recently    Discussed with primary team.    Gurinder Rice MD, Endocrinology Fellow  Pager [phone] from 9am to 5pm. After hours and on weekends, please call [phone].   68F pmhx HTN, HLD, T2DM, CKD, b/l knee OA presents with worsening b/l knee pain. Endocrinology consulted for management of T2DM.    Poorly controlled T2DM with hyperglycemia  - HbA1c: 10.1  - Home Regimen: not on medication  - Endocrinologist: does not have  PLAN  - Start Lantus 10 units at bedtime. DO NOT HOLD IF NPO.  - Start Admelog 3 units TID pre-meal. HOLD IF NPO.  - Use low dose Admelog correction scale pre-meal  - Use low dose Admelog correction scale at bedtime  - Fingerstick BG before meals and bedtime  - hypoglycemia protocol prn  - Goal -180  - Carbohydrate consistent diet  - RD consult  - provider to RN for glucometer teaching  Discharge plan:  - Discharge medications: Patient does not want metformin or insulin on discharge and has a sulfa allergy. Patient will need at least 2 agents. Please price check the following: SGLT-2 inhibitors (farxiga 5mg daily or jardiance 10mg daily), GLP1 agonist (ozempic 0.25mg once weekly, mounjaro 2.5mg once weekly, trulicity 0.75mg once weekly), pioglitazone 15mg once daily, DPP-4 inhibitor (januvia 100mg daily (pending GFR), tradjenta 5mg daily). Based on coverage will pick 2 agents. If only one of those agents available, will need to see if can use prandin given sulfa allergy or not.  - Patient to call doctor with persistent high or low BG at home.   - Ensure patient has glucometer, test strips and lancets on discharge.  - Recommend routine outpatient ophthalmology, podiatry and endocrinology f/u. Patient reports she does not want endocrinology follow up and will follow up with PCP.    HTN  - Home regimen: lisinopril 20mg daily  PLAN  - Can check urine microalbumin outpatient  - Outpatient goal BP <130/80. Management per primary team.    HLD  - Home regimen: atorvastatin 40mg daily  PLAN  - Continue atorvastatin 40mg daily per primary team  - Can check lipid profile if not done recently    Discussed with primary team.    Gurinder Rice MD, Endocrinology Fellow  Pager [phone] from 9am to 5pm. After hours and on weekends, please call [phone].   68F pmhx HTN, HLD, T2DM, CKD, b/l knee OA presents with worsening b/l knee pain. Endocrinology consulted for management of T2DM.    Poorly controlled T2DM with hyperglycemia  - HbA1c: 10.1  - Home Regimen: not on medication  - Endocrinologist: does not have  PLAN  - Start Lantus 10 units at bedtime. DO NOT HOLD IF NPO.  - Start Admelog 3 units TID pre-meal. HOLD IF NPO.  - Use low dose Admelog correction scale pre-meal  - Use low dose Admelog correction scale at bedtime  - Fingerstick BG before meals and bedtime  - hypoglycemia protocol prn  - Goal -180  - Carbohydrate consistent diet  - RD consult  - provider to RN for glucometer teaching  Discharge plan:  - Discharge medications: Patient does not want metformin or insulin on discharge and has a sulfa allergy. Patient will need at least 2 agents. Please price check the following: SGLT-2 inhibitors (farxiga 5mg daily or jardiance 10mg daily), GLP1 agonist (ozempic 0.25mg once weekly, mounjaro 2.5mg once weekly, trulicity 0.75mg once weekly), pioglitazone 15mg once daily, DPP-4 inhibitor (januvia 100mg daily (pending GFR), tradjenta 5mg daily). Based on coverage will pick 2 agents. If only one of those agents available, will need to see if can use prandin given sulfa allergy or not.  - Patient to call doctor with persistent high or low BG at home.   - Ensure patient has glucometer, test strips and lancets on discharge.  - Recommend routine outpatient ophthalmology, podiatry and endocrinology f/u. Patient reports she does not want endocrinology follow up and will follow up with PCP.    HTN  - Home regimen: lisinopril 20mg daily  PLAN  - Can check urine microalbumin outpatient  - Outpatient goal BP <130/80. Management per primary team.    HLD  - Home regimen: atorvastatin 40mg daily  PLAN  - Continue atorvastatin 40mg daily per primary team  - Can check lipid profile if not done recently    Discussed with primary team.    Gurinder Rice MD, Endocrinology Fellow  Pager [phone] from 9am to 5pm. After hours and on weekends, please call [phone].

## 2024-01-05 NOTE — DISCHARGE NOTE PROVIDER - NSDCFUADDAPPT_GEN_ALL_CORE_FT
APPTS ARE READY TO BE MADE: [x] YES    Best Family or Patient Contact (if needed):    Additional Information about above appointments (if needed):    1:   2:   3:     Other comments or requests:    APPTS ARE READY TO BE MADE: [x] YES    Best Family or Patient Contact (if needed):    Additional Information about above appointments (if needed):    1:   2:   3:     Other comments or requests:   Pt is jess with Dr. Bashir on 01/09, [address] [place] [address] location.  Pt is jess with Dr. Guajardo on 1/22 10:20am,  1 [facility] location.  APPTS ARE READY TO BE MADE: [x] YES    Best Family or Patient Contact (if needed):    Additional Information about above appointments (if needed):    1:   2:   3:     Other comments or requests:   Pt is jess with Dr. Bashir on 01/09, [address] [place] [address] location.  Pt is jess with Dr. Guajardo on 1/22 10:20am,  1 [facility] location.

## 2024-01-05 NOTE — PHYSICAL THERAPY INITIAL EVALUATION ADULT - PERTINENT HX OF CURRENT PROBLEM, REHAB EVAL
68 year old female with PMHx HTN, HLD, DM (diet controlled), CKD, b/l knee OA presents with worsening b/l knee pain. At baseline, pt walks without assistance. Due to OA, she has started to need to "hop" to ambulate. However, over the past 4 months, her knee pain has worsened. She now has to hold onto walls and furniture to ambulate. Over the past few days she has not been able to ambulate at all due to pain, prompting her to come to the hospital. She describes the pain as occurring in b/l knees, L>R, with throbbing, sharp pain, rated 8-9/10. She also has concurrent back pain radiating down the posterior aspect of her L leg. She endorses swelling of both knees with some warmness. Endorses subjective fevers. Xray bilateral knees negative for dislocation or fracture.

## 2024-01-05 NOTE — DISCHARGE NOTE PROVIDER - PROVIDER TOKENS
PROVIDER:[TOKEN:[[id number]],SCHEDULEDAPPT:[01/09/2024],ESTABLISHEDPATIENT:[T]] PROVIDER:[TOKEN:[[id number]],SCHEDULEDAPPT:[01/09/2024],ESTABLISHEDPATIENT:[T]],PROVIDER:[TOKEN:[[id number]],FOLLOWUP:[1 month],ESTABLISHEDPATIENT:[T]] PROVIDER:[[id number]],SCHEDULEDAPPT:[01/09/2024],ESTABLISHEDPATIENT:[T]],PROVIDER:[TOKEN:[[id number]],FOLLOWUP:[1 month],ESTABLISHEDPATIENT:[T]]

## 2024-01-05 NOTE — PHYSICAL THERAPY INITIAL EVALUATION ADULT - PLANNED THERAPY INTERVENTIONS, PT EVAL
Patient left sitting in chair, in NAD, call bell in reach, all lines intact. Nursing staff aware./balance training/bed mobility training/gait training/strengthening/transfer training

## 2024-01-05 NOTE — DISCHARGE NOTE NURSING/CASE MANAGEMENT/SOCIAL WORK - NSDCPEFALRISK_GEN_ALL_CORE
For information on Fall & Injury Prevention, visit: [email or web address] OR  [email or web address] OR  https://www.cdc.gov/steadi/patient.html For information on Fall & Injury Prevention, visit: [email or web address] OR  [email or web address] OR  https://www.cdc.gov/steadi/patient.html

## 2024-01-05 NOTE — PROGRESS NOTE ADULT - PROBLEM SELECTOR PLAN 1
- now has difficulty ambulating  - arthrocentesis negative for gout and septic arthritis  - suspect worsening of OA  - pain regimen: mild - tylenol 650mg q6h, moderate - oxy 2.5mg q4h, severe - oxy 5mg q4h  - PT [facility]

## 2024-01-05 NOTE — DISCHARGE NOTE NURSING/CASE MANAGEMENT/SOCIAL WORK - PATIENT PORTAL LINK FT
You can access the FollowMyHealth Patient Portal offered by [facility] by registering at the following website: [email or web address]. By joining [employer]’s FollowMyHealth portal, you will also be able to view your health information using other applications (apps) compatible with our system. You can access the FollowMyHealth Patient Portal offered by [facility] by registering at the following website: [email or web address]. By joining [employer]’s FollowMyHealth portal, you will also be able to view your health information using other applications (apps) compatible with our system.

## 2024-01-05 NOTE — CONSULT NOTE ADULT - SUBJECTIVE AND OBJECTIVE BOX
ENDOCRINE INITIAL CONSULT - T2DM    HPI:  68F pmhx HTN, HLD, T2DM, CKD, b/l knee OA presents with worsening b/l knee pain. At baseline, pt walks without assistance. Due to OA, she has started to need to "hop" to ambulate. However, over the past 4 months, her knee pain has worsened. She now has to hold onto walls and furniture to ambulate. Over the past few days she has not been able to ambulate at all due to pain, prompting her to come to the hospital. She describes the pain as occurring in b/l knees, L>R, with throbbing, sharp pain, rated 8-9/10. She also has concurrent back pain radiating down the posterior aspect of her L leg. She endorses swelling of both knees with some warmness. Endorses subjective fevers. No loss of sensation, motor strength. No fecal or urinary incontinence (2024 15:05)      ENDOCRINE HISTORY   Diagnosed with DM:   Last HbA1c: 10.1  Endocrinologist:   Home DM Meds:  Adherence:  Microvascular complications: Renal, opthalmologic, neuropathy  Macrovascular complications:  SMBG:  Symptoms:  Hypoglycemia episodes:  BG at home:  Diet at home:  Appetite in hospital:  Exercise:  PMHx:  PSHx:  Family hx:  Social hx:  Insurance:  Resides in:      PAST MEDICAL & SURGICAL HISTORY:  CAD (Coronary Artery Disease)      HTN (Hypertension)      Cardiomegaly      Sickle Cell Trait      Fibroids      Hyperlipidemia      Herniated Disc  Lumbar      Obese      Diabetes mellitus      S/P  Section  times 2-,       Status Post Umbilical Hernia Repair        Missed         Status Post Biopsy  Uterine-11      Fibroid uterus  had surgery ( 2 years ago )          FAMILY HISTORY:  No pertinent family history in first degree relatives        Social History:  Denies alcohol use, smoking, recreational drug use. Never smoker. Lives at home with daughter. 2 other children live in [place]. 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 no current record of 40 mg) (2024 15:56)  lisinopril 20 mg oral tablet: 1 tab(s) orally once a day (2024 14:55)  senna (sennosides) 8.6 mg oral tablet: 1 tab(s) orally as needed (2024 14:55)  Vitamin A: (unsure of strength and frequency) (2024 14:55)  Vitamin B: (unsure of strength and frequency) (2024 14:55)  Vitamin C: (unsure of strength and frequency) (2024 14:55)  Vitamin D: (unsure of strength and frequency) (2024 14:55)      MEDICATIONS  (STANDING):  aspirin enteric coated 81 milliGRAM(s) Oral daily  atorvastatin 40 milliGRAM(s) Oral at bedtime  dextrose 5%. 1000 milliLiter(s) (100 mL/Hr) IV Continuous <Continuous>  dextrose 5%. 1000 milliLiter(s) (50 mL/Hr) IV Continuous <Continuous>  dextrose 50% Injectable 25 Gram(s) IV Push once  dextrose 50% Injectable 25 Gram(s) IV Push once  dextrose 50% Injectable 12.5 Gram(s) IV Push once  glucagon  Injectable 1 milliGRAM(s) IntraMuscular once  heparin   Injectable 5000 Unit(s) SubCutaneous every 8 hours  influenza  Vaccine (HIGH DOSE) 0.7 milliLiter(s) IntraMuscular once  insulin lispro (ADMELOG) corrective regimen sliding scale   SubCutaneous at bedtime  insulin lispro (ADMELOG) corrective regimen sliding scale   SubCutaneous three times a day before meals  lactated ringers. 1000 milliLiter(s) (75 mL/Hr) IV Continuous <Continuous>  lidocaine   4% Patch 1 Patch Transdermal every 24 hours  multivitamin 1 Tablet(s) Oral daily  naloxone Injectable 0.4 milliGRAM(s) IV Push once  polyethylene glycol 3350 17 Gram(s) Oral daily  senna 2 Tablet(s) Oral at bedtime    MEDICATIONS  (PRN):  acetaminophen     Tablet .. 650 milliGRAM(s) Oral every 6 hours PRN Temp greater or equal to 38C (100.4F), Mild Pain (1 - 3)  aluminum hydroxide/magnesium hydroxide/simethicone Suspension 30 milliLiter(s) Oral every 4 hours PRN Dyspepsia  bisacodyl 5 milliGRAM(s) Oral daily PRN Constipation  dextrose Oral Gel 15 Gram(s) Oral once PRN Blood Glucose LESS THAN 70 milliGRAM(s)/deciliter  melatonin 3 milliGRAM(s) Oral at bedtime PRN Insomnia  ondansetron Injectable 4 milliGRAM(s) IV Push every 8 hours PRN Nausea and/or Vomiting  oxyCODONE    IR 5 milliGRAM(s) Oral every 4 hours PRN Severe Pain (7 - 10)  oxyCODONE    IR 2.5 milliGRAM(s) Oral every 4 hours PRN Moderate Pain (4 - 6)      Allergies    Sulfa (Unknown)  latex (Other; Swelling)  Sulfur (Unknown)    Intolerances        REVIEW OF SYSTEMS  Constitutional: No fever  Eyes: No blurry vision  Neuro: No tremors  HEENT: No pain  Cardiovascular: No chest pain, palpitations  Respiratory: No SOB, no cough  GI: No nausea, vomiting, abdominal pain  : No dysuria  Skin: no rash  Psych: no depression  Endocrine: no polyuria, polydipsia  Hem/lymph: no swelling  Osteoporosis: no fractures  ALL OTHER SYSTEMS REVIEWED AND NEGATIVE     UNABLE TO OBTAIN     PHYSICAL EXAM   Vital Signs Last 24 Hrs  T(C): 36.7 (2024 05:12), Max: 36.8 (2024 20:58)  T(F): 98.1 (2024 05:12), Max: 98.2 (2024 20:58)  HR: 74 (2024 05:12) (61 - 74)  BP: 175/97 (2024 05:12) (140/82 - 175/97)  BP(mean): --  RR: 17 (2024 05:12) (17 - 18)  SpO2: 100% (2024 05:12) (99% - 100%)    Parameters below as of 2024 05:12  Patient On (Oxygen Delivery Method): room air      GENERAL: NAD, well-groomed, well-developed  EYES: No proptosis, no lid lag, anicteric  HEENT:  Atraumatic, Normocephalic, moist mucous membranes  THYROID: Normal size, no palpable nodules  RESPIRATORY: Clear to auscultation bilaterally; No rales, rhonchi, wheezing  CARDIOVASCULAR: Regular rate and rhythm; No murmurs; no peripheral edema  GI: Soft, nontender, non distended, normal bowel sounds  SKIN: Dry, intact, No rashes or lesions  MUSCULOSKELETAL: Full range of motion, normal strength  NEURO: sensation intact, extraocular movements intact, no tremor  PSYCH: Alert and oriented x 3, normal affect, normal mood  CUSHING'S SIGNS: no striae    CAPILLARY BLOOD GLUCOSE      POCT Blood Glucose.: 258 mg/dL (2024 12:31)  POCT Blood Glucose.: 188 mg/dL (2024 08:43)  POCT Blood Glucose.: 296 mg/dL (2024 21:16)  POCT Blood Glucose.: 216 mg/dL (2024 18:44)  POCT Blood Glucose.: 155 mg/dL (2024 17:28)      A1C with Estimated Average Glucose Result: 10.1 % (24 @ 02:37)                            11.5   6.85  )-----------( 218      ( 2024 02:37 )             35.3       01-05    137  |  103  |  21  ----------------------------<  168<H>  4.1   |  26  |  1.15    Ca    10.4      2024 06:35  Phos  3.3     -05  Mg     2.00     -05    TPro  7.4  /  Alb  3.8  /  TBili  0.7  /  DBili  x   /  AST  18  /  ALT  15  /  AlkPhos  82  -          LIPIDS    RADIOLOGY ENDOCRINE INITIAL CONSULT - T2DM    HPI:  68F pmhx HTN, HLD, T2DM, CKD, b/l knee OA presents with worsening b/l knee pain. At baseline, pt walks without assistance. Due to OA, she has started to need to "hop" to ambulate. However, over the past 4 months, her knee pain has worsened. She now has to hold onto walls and furniture to ambulate. Over the past few days she has not been able to ambulate at all due to pain, prompting her to come to the hospital. She describes the pain as occurring in b/l knees, L>R, with throbbing, sharp pain, rated 8-9/10. She also has concurrent back pain radiating down the posterior aspect of her L leg. She endorses swelling of both knees with some warmness. Endorses subjective fevers. No loss of sensation, motor strength. No fecal or urinary incontinence (2024 15:05)      ENDOCRINE HISTORY   Diagnosed with DM: T2DM, diagnosed over 30 years ago  Last HbA1c: 10.1  Endocrinologist: does not have  Home DM Meds: previously on metformin and another medication for her kidneys but stopped taking them a few months because it was too expensive  Microvascular complications: denies retinopathy, neuropathy, nephropathy  Macrovascular complications: denies MI or CVA  SMBG: does not have glucometer  Symptoms: endorses polyuria, denies polydipsia, neuropathy  Diet at home:  - Breakfast: "whatever she wants", eggs, toast, chicken  - Lunch: salad, fruit  - Dinner: chicken, fish, turkey  - Snacks: denies  - Denies soda, endorses fresh fruit and vegetable juice  Appetite in hospital: okay  Exercise: unable to due to arthritis  PMHx: as above  PSHx: as above  Family hx: endorses mother with diabetes (unsure what type), denies family history of thyroid disease  Social hx: denies illicit substance use  Insurance: Medicare  Resides in: [facility]      PAST MEDICAL & SURGICAL HISTORY:  CAD (Coronary Artery Disease)      HTN (Hypertension)      Cardiomegaly      Sickle Cell Trait      Fibroids      Hyperlipidemia      Herniated Disc  Lumbar      Obese      Diabetes mellitus      S/P  Section  times 2-1996      Status Post Umbilical Hernia Repair        Missed         Status Post Biopsy  Uterine-11      Fibroid uterus  had surgery ( 2 years ago )          FAMILY HISTORY:  No pertinent family history in first degree relatives        Social History:  Denies alcohol use, smoking, recreational drug use. Never smoker. Lives at home with daughter. 2 other children live in [place]. 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 no current record of 40 mg) (2024 15:56)  lisinopril 20 mg oral tablet: 1 tab(s) orally once a day (2024 14:55)  senna (sennosides) 8.6 mg oral tablet: 1 tab(s) orally as needed (2024 14:55)  Vitamin A: (unsure of strength and frequency) (2024 14:55)  Vitamin B: (unsure of strength and frequency) (2024 14:55)  Vitamin C: (unsure of strength and frequency) (2024 14:55)  Vitamin D: (unsure of strength and frequency) (2024 14:55)      MEDICATIONS  (STANDING):  aspirin enteric coated 81 milliGRAM(s) Oral daily  atorvastatin 40 milliGRAM(s) Oral at bedtime  dextrose 5%. 1000 milliLiter(s) (100 mL/Hr) IV Continuous <Continuous>  dextrose 5%. 1000 milliLiter(s) (50 mL/Hr) IV Continuous <Continuous>  dextrose 50% Injectable 25 Gram(s) IV Push once  dextrose 50% Injectable 25 Gram(s) IV Push once  dextrose 50% Injectable 12.5 Gram(s) IV Push once  glucagon  Injectable 1 milliGRAM(s) IntraMuscular once  heparin   Injectable 5000 Unit(s) SubCutaneous every 8 hours  influenza  Vaccine (HIGH DOSE) 0.7 milliLiter(s) IntraMuscular once  insulin lispro (ADMELOG) corrective regimen sliding scale   SubCutaneous at bedtime  insulin lispro (ADMELOG) corrective regimen sliding scale   SubCutaneous three times a day before meals  lactated ringers. 1000 milliLiter(s) (75 mL/Hr) IV Continuous <Continuous>  lidocaine   4% Patch 1 Patch Transdermal every 24 hours  multivitamin 1 Tablet(s) Oral daily  naloxone Injectable 0.4 milliGRAM(s) IV Push once  polyethylene glycol 3350 17 Gram(s) Oral daily  senna 2 Tablet(s) Oral at bedtime    MEDICATIONS  (PRN):  acetaminophen     Tablet .. 650 milliGRAM(s) Oral every 6 hours PRN Temp greater or equal to 38C (100.4F), Mild Pain (1 - 3)  aluminum hydroxide/magnesium hydroxide/simethicone Suspension 30 milliLiter(s) Oral every 4 hours PRN Dyspepsia  bisacodyl 5 milliGRAM(s) Oral daily PRN Constipation  dextrose Oral Gel 15 Gram(s) Oral once PRN Blood Glucose LESS THAN 70 milliGRAM(s)/deciliter  melatonin 3 milliGRAM(s) Oral at bedtime PRN Insomnia  ondansetron Injectable 4 milliGRAM(s) IV Push every 8 hours PRN Nausea and/or Vomiting  oxyCODONE    IR 5 milliGRAM(s) Oral every 4 hours PRN Severe Pain (7 - 10)  oxyCODONE    IR 2.5 milliGRAM(s) Oral every 4 hours PRN Moderate Pain (4 - 6)      Allergies    Sulfa (Unknown)  latex (Other; Swelling)  Sulfur (Unknown)    Intolerances        REVIEW OF SYSTEMS  Constitutional: No fever  Eyes: No blurry vision  Neuro: No tremors  HEENT: No pain  Cardiovascular: No chest pain, palpitations  Respiratory: No SOB, no cough  GI: No nausea, vomiting, abdominal pain, diarrhea, constipation  : No dysuria  Skin: no rash  Psych: no depression  Endocrine: endorses polyuria, denies polydipsia  Hem/lymph: endorses swelling  Osteoporosis: no fractures  ALL OTHER SYSTEMS REVIEWED AND NEGATIVE       PHYSICAL EXAM   Vital Signs Last 24 Hrs  T(C): 36.7 (2024 05:12), Max: 36.8 (2024 20:58)  T(F): 98.1 (2024 05:12), Max: 98.2 (2024 20:58)  HR: 74 (2024 05:12) (61 - 74)  BP: 175/97 (2024 05:12) (140/82 - 175/97)  BP(mean): --  RR: 17 (2024 05:12) (17 - 18)  SpO2: 100% (2024 05:12) (99% - 100%)    Parameters below as of 2024 05:12  Patient On (Oxygen Delivery Method): room air      GENERAL: NAD, well-groomed, well-developed  EYES: No proptosis, no lid lag, anicteric  HEENT:  Atraumatic, Normocephalic, moist mucous membranes  THYROID: Normal size, no palpable nodules  RESPIRATORY: Clear to auscultation bilaterally; No rales, rhonchi, wheezing  CARDIOVASCULAR: Regular rate and rhythm; No murmurs; no peripheral edema  GI: Soft, nontender, non distended, normal bowel sounds  SKIN: Dry, intact, No rashes or lesions  MUSCULOSKELETAL: Full range of motion, normal strength  NEURO: sensation intact, extraocular movements intact, no tremor  PSYCH: Alert and oriented x 3, normal affect, normal mood  CUSHING'S SIGNS: no striae    CAPILLARY BLOOD GLUCOSE      POCT Blood Glucose.: 258 mg/dL (2024 12:31)  POCT Blood Glucose.: 188 mg/dL (2024 08:43)  POCT Blood Glucose.: 296 mg/dL (2024 21:16)  POCT Blood Glucose.: 216 mg/dL (2024 18:44)  POCT Blood Glucose.: 155 mg/dL (2024 17:28)      A1C with Estimated Average Glucose Result: 10.1 % (24 @ 02:37)                            11.5   6.85  )-----------( 218      ( 2024 02:37 )             35.3       -05    137  |  103  |  21  ----------------------------<  168<H>  4.1   |  26  |  1.15    Ca    10.4      2024 06:35  Phos  3.3     -  Mg     2.00     -    TPro  7.4  /  Alb  3.8  /  TBili  0.7  /  DBili  x   /  AST  18  /  ALT  15  /  AlkPhos  82            LIPIDS    RADIOLOGY ENDOCRINE INITIAL CONSULT - T2DM    HPI:  68F pmhx HTN, HLD, T2DM, CKD, b/l knee OA presents with worsening b/l knee pain. At baseline, pt walks without assistance. Due to OA, she has started to need to "hop" to ambulate. However, over the past 4 months, her knee pain has worsened. She now has to hold onto walls and furniture to ambulate. Over the past few days she has not been able to ambulate at all due to pain, prompting her to come to the hospital. She describes the pain as occurring in b/l knees, L>R, with throbbing, sharp pain, rated 8-9/10. She also has concurrent back pain radiating down the posterior aspect of her L leg. She endorses swelling of both knees with some warmness. Endorses subjective fevers. No loss of sensation, motor strength. No fecal or urinary incontinence (2024 15:05)      ENDOCRINE HISTORY   Diagnosed with DM: T2DM, diagnosed over 30 years ago  Last HbA1c: 10.1  Endocrinologist: does not have  Home DM Meds: previously on metformin and another medication for her kidneys but stopped taking them a few months because it was too expensive  Microvascular complications: denies retinopathy, neuropathy, nephropathy  Macrovascular complications: denies MI or CVA  SMBG: does not have glucometer  Symptoms: endorses polyuria, denies polydipsia, neuropathy  Diet at home:  - Breakfast: "whatever she wants", eggs, toast, chicken  - Lunch: salad, fruit  - Dinner: chicken, fish, turkey  - Snacks: denies  - Denies soda, endorses fresh fruit and vegetable juice  Appetite in hospital: okay  Exercise: unable to due to arthritis  PMHx: as above  PSHx: as above  Family hx: endorses mother with diabetes (unsure what type), denies family history of thyroid disease  Social hx: denies illicit substance use  Insurance: Medicare  Resides in: [facility]      PAST MEDICAL & SURGICAL HISTORY:  CAD (Coronary Artery Disease)      HTN (Hypertension)      Cardiomegaly      Sickle Cell Trait      Fibroids      Hyperlipidemia      Herniated Disc  Lumbar      Obese      Diabetes mellitus      S/P  Section  times 2-1996      Status Post Umbilical Hernia Repair        Missed         Status Post Biopsy  Uterine-11      Fibroid uterus  had surgery ( 2 years ago )          FAMILY HISTORY:  No pertinent family history in first degree relatives        Social History:  Denies alcohol use, smoking, recreational drug use. Never smoker. Lives at home with daughter. 2 other children live in [place]. 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 no current record of 40 mg) (2024 15:56)  lisinopril 20 mg oral tablet: 1 tab(s) orally once a day (2024 14:55)  senna (sennosides) 8.6 mg oral tablet: 1 tab(s) orally as needed (2024 14:55)  Vitamin A: (unsure of strength and frequency) (2024 14:55)  Vitamin B: (unsure of strength and frequency) (2024 14:55)  Vitamin C: (unsure of strength and frequency) (2024 14:55)  Vitamin D: (unsure of strength and frequency) (2024 14:55)      MEDICATIONS  (STANDING):  aspirin enteric coated 81 milliGRAM(s) Oral daily  atorvastatin 40 milliGRAM(s) Oral at bedtime  dextrose 5%. 1000 milliLiter(s) (100 mL/Hr) IV Continuous <Continuous>  dextrose 5%. 1000 milliLiter(s) (50 mL/Hr) IV Continuous <Continuous>  dextrose 50% Injectable 25 Gram(s) IV Push once  dextrose 50% Injectable 25 Gram(s) IV Push once  dextrose 50% Injectable 12.5 Gram(s) IV Push once  glucagon  Injectable 1 milliGRAM(s) IntraMuscular once  heparin   Injectable 5000 Unit(s) SubCutaneous every 8 hours  influenza  Vaccine (HIGH DOSE) 0.7 milliLiter(s) IntraMuscular once  insulin lispro (ADMELOG) corrective regimen sliding scale   SubCutaneous at bedtime  insulin lispro (ADMELOG) corrective regimen sliding scale   SubCutaneous three times a day before meals  lactated ringers. 1000 milliLiter(s) (75 mL/Hr) IV Continuous <Continuous>  lidocaine   4% Patch 1 Patch Transdermal every 24 hours  multivitamin 1 Tablet(s) Oral daily  naloxone Injectable 0.4 milliGRAM(s) IV Push once  polyethylene glycol 3350 17 Gram(s) Oral daily  senna 2 Tablet(s) Oral at bedtime    MEDICATIONS  (PRN):  acetaminophen     Tablet .. 650 milliGRAM(s) Oral every 6 hours PRN Temp greater or equal to 38C (100.4F), Mild Pain (1 - 3)  aluminum hydroxide/magnesium hydroxide/simethicone Suspension 30 milliLiter(s) Oral every 4 hours PRN Dyspepsia  bisacodyl 5 milliGRAM(s) Oral daily PRN Constipation  dextrose Oral Gel 15 Gram(s) Oral once PRN Blood Glucose LESS THAN 70 milliGRAM(s)/deciliter  melatonin 3 milliGRAM(s) Oral at bedtime PRN Insomnia  ondansetron Injectable 4 milliGRAM(s) IV Push every 8 hours PRN Nausea and/or Vomiting  oxyCODONE    IR 5 milliGRAM(s) Oral every 4 hours PRN Severe Pain (7 - 10)  oxyCODONE    IR 2.5 milliGRAM(s) Oral every 4 hours PRN Moderate Pain (4 - 6)      Allergies    Sulfa (Unknown)  latex (Other; Swelling)  Sulfur (Unknown)    Intolerances        REVIEW OF SYSTEMS  Constitutional: No fever  Eyes: No blurry vision  Neuro: No tremors  HEENT: No pain  Cardiovascular: No chest pain, palpitations  Respiratory: No SOB, no cough  GI: No nausea, vomiting, abdominal pain, diarrhea, constipation  : No dysuria  Skin: no rash  Psych: no depression  Endocrine: endorses polyuria, denies polydipsia  Hem/lymph: endorses swelling  Osteoporosis: no fractures  ALL OTHER SYSTEMS REVIEWED AND NEGATIVE       PHYSICAL EXAM   Vital Signs Last 24 Hrs  T(C): 36.7 (2024 05:12), Max: 36.8 (2024 20:58)  T(F): 98.1 (2024 05:12), Max: 98.2 (2024 20:58)  HR: 74 (2024 05:12) (61 - 74)  BP: 175/97 (2024 05:12) (140/82 - 175/97)  BP(mean): --  RR: 17 (2024 05:12) (17 - 18)  SpO2: 100% (2024 05:12) (99% - 100%)    Parameters below as of 2024 05:12  Patient On (Oxygen Delivery Method): room air      GENERAL: NAD, well-groomed, well-developed  EYES: No proptosis, no lid lag, anicteric  HEENT:  Atraumatic, Normocephalic, moist mucous membranes  THYROID: Normal size, no palpable nodules  RESPIRATORY: Clear to auscultation bilaterally; No rales, rhonchi, wheezing  CARDIOVASCULAR: Regular rate and rhythm; No murmurs; no peripheral edema  GI: Soft, nontender, non distended, normal bowel sounds  SKIN: Dry, intact, No rashes or lesions  MUSCULOSKELETAL: Full range of motion, normal strength  NEURO: sensation intact, extraocular movements intact, no tremor  PSYCH: Alert and oriented x 3, normal affect, normal mood  CUSHING'S SIGNS: no striae    CAPILLARY BLOOD GLUCOSE      POCT Blood Glucose.: 258 mg/dL (2024 12:31)  POCT Blood Glucose.: 188 mg/dL (2024 08:43)  POCT Blood Glucose.: 296 mg/dL (2024 21:16)  POCT Blood Glucose.: 216 mg/dL (2024 18:44)  POCT Blood Glucose.: 155 mg/dL (2024 17:28)      A1C with Estimated Average Glucose Result: 10.1 % (24 @ 02:37)                            11.5   6.85  )-----------( 218      ( 2024 02:37 )             35.3       -05    137  |  103  |  21  ----------------------------<  168<H>  4.1   |  26  |  1.15    Ca    10.4      2024 06:35  Phos  3.3     -  Mg     2.00     -    TPro  7.4  /  Alb  3.8  /  TBili  0.7  /  DBili  x   /  AST  18  /  ALT  15  /  AlkPhos  82            LIPIDS    RADIOLOGY ENDOCRINE INITIAL CONSULT - T2DM    HPI:  68F pmhx HTN, HLD, T2DM, CKD, b/l knee OA presents with worsening b/l knee pain. At baseline, pt walks without assistance. Due to OA, she has started to need to "hop" to ambulate. However, over the past 4 months, her knee pain has worsened. She now has to hold onto walls and furniture to ambulate. Over the past few days she has not been able to ambulate at all due to pain, prompting her to come to the hospital. She describes the pain as occurring in b/l knees, L>R, with throbbing, sharp pain, rated 8-9/10. She also has concurrent back pain radiating down the posterior aspect of her L leg. She endorses swelling of both knees with some warmness. Endorses subjective fevers. No loss of sensation, motor strength. No fecal or urinary incontinence (2024 15:05)      ENDOCRINE HISTORY   Diagnosed with DM: T2DM, diagnosed over 30 years ago  Last HbA1c: 10.1  Endocrinologist: does not have  Home DM Meds: previously on metformin and another medication for her kidneys but stopped taking them a few months because it was too expensive  Microvascular complications: denies retinopathy, neuropathy, nephropathy  Macrovascular complications: denies MI or CVA  SMBG: does not have glucometer  Symptoms: endorses polyuria, denies polydipsia, neuropathy  Diet at home:  - Breakfast: "whatever she wants", eggs, toast, chicken  - Lunch: salad, fruit  - Dinner: chicken, fish, turkey  - Snacks: denies  - Denies soda, endorses fresh fruit and vegetable juice  Appetite in hospital: okay  Exercise: unable to due to arthritis  PMHx: as above  PSHx: as above  Family hx: endorses mother with diabetes (unsure what type), denies family history of thyroid disease  Social hx: denies illicit substance use  Insurance: Medicare  Resides in: [facility]      PAST MEDICAL & SURGICAL HISTORY:  CAD (Coronary Artery Disease)      HTN (Hypertension)      Cardiomegaly      Sickle Cell Trait      Fibroids      Hyperlipidemia      Herniated Disc  Lumbar      Obese      Diabetes mellitus      S/P  Section  times 2-1996      Status Post Umbilical Hernia Repair        Missed         Status Post Biopsy  Uterine-11      Fibroid uterus  had surgery ( 2 years ago )          FAMILY HISTORY:  No pertinent family history in first degree relatives        Social History:  Denies alcohol use, smoking, recreational drug use. Never smoker. Lives at home with daughter. 2 other children live in [place]. 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 no current record of 40 mg) (2024 15:56)  lisinopril 20 mg oral tablet: 1 tab(s) orally once a day (2024 14:55)  senna (sennosides) 8.6 mg oral tablet: 1 tab(s) orally as needed (2024 14:55)  Vitamin A: (unsure of strength and frequency) (2024 14:55)  Vitamin B: (unsure of strength and frequency) (2024 14:55)  Vitamin C: (unsure of strength and frequency) (2024 14:55)  Vitamin D: (unsure of strength and frequency) (2024 14:55)      MEDICATIONS  (STANDING):  aspirin enteric coated 81 milliGRAM(s) Oral daily  atorvastatin 40 milliGRAM(s) Oral at bedtime  dextrose 5%. 1000 milliLiter(s) (100 mL/Hr) IV Continuous <Continuous>  dextrose 5%. 1000 milliLiter(s) (50 mL/Hr) IV Continuous <Continuous>  dextrose 50% Injectable 25 Gram(s) IV Push once  dextrose 50% Injectable 25 Gram(s) IV Push once  dextrose 50% Injectable 12.5 Gram(s) IV Push once  glucagon  Injectable 1 milliGRAM(s) IntraMuscular once  heparin   Injectable 5000 Unit(s) SubCutaneous every 8 hours  influenza  Vaccine (HIGH DOSE) 0.7 milliLiter(s) IntraMuscular once  insulin lispro (ADMELOG) corrective regimen sliding scale   SubCutaneous at bedtime  insulin lispro (ADMELOG) corrective regimen sliding scale   SubCutaneous three times a day before meals  lactated ringers. 1000 milliLiter(s) (75 mL/Hr) IV Continuous <Continuous>  lidocaine   4% Patch 1 Patch Transdermal every 24 hours  multivitamin 1 Tablet(s) Oral daily  naloxone Injectable 0.4 milliGRAM(s) IV Push once  polyethylene glycol 3350 17 Gram(s) Oral daily  senna 2 Tablet(s) Oral at bedtime    MEDICATIONS  (PRN):  acetaminophen     Tablet .. 650 milliGRAM(s) Oral every 6 hours PRN Temp greater or equal to 38C (100.4F), Mild Pain (1 - 3)  aluminum hydroxide/magnesium hydroxide/simethicone Suspension 30 milliLiter(s) Oral every 4 hours PRN Dyspepsia  bisacodyl 5 milliGRAM(s) Oral daily PRN Constipation  dextrose Oral Gel 15 Gram(s) Oral once PRN Blood Glucose LESS THAN 70 milliGRAM(s)/deciliter  melatonin 3 milliGRAM(s) Oral at bedtime PRN Insomnia  ondansetron Injectable 4 milliGRAM(s) IV Push every 8 hours PRN Nausea and/or Vomiting  oxyCODONE    IR 5 milliGRAM(s) Oral every 4 hours PRN Severe Pain (7 - 10)  oxyCODONE    IR 2.5 milliGRAM(s) Oral every 4 hours PRN Moderate Pain (4 - 6)      Allergies    Sulfa (Unknown)  latex (Other; Swelling)  Sulfur (Unknown)    Intolerances        REVIEW OF SYSTEMS  Constitutional: No fever  Eyes: No blurry vision  Neuro: No tremors  HEENT: No pain  Cardiovascular: No chest pain, palpitations  Respiratory: No SOB, no cough  GI: No nausea, vomiting, abdominal pain, diarrhea, constipation  : No dysuria  Skin: no rash  Psych: no depression  Endocrine: endorses polyuria, denies polydipsia  Hem/lymph: endorses swelling  Osteoporosis: no fractures  ALL OTHER SYSTEMS REVIEWED AND NEGATIVE       PHYSICAL EXAM   Vital Signs Last 24 Hrs  T(C): 36.7 (2024 05:12), Max: 36.8 (2024 20:58)  T(F): 98.1 (2024 05:12), Max: 98.2 (2024 20:58)  HR: 74 (2024 05:12) (61 - 74)  BP: 175/97 (2024 05:12) (140/82 - 175/97)  BP(mean): --  RR: 17 (2024 05:12) (17 - 18)  SpO2: 100% (2024 05:12) (99% - 100%)    Parameters below as of 2024 05:12  Patient On (Oxygen Delivery Method): room air      GENERAL: NAD, obese  EYES: No proptosis, no lid lag, anicteric  HEENT:  Atraumatic, Normocephalic, mildly dry mucous membranes  THYROID: Normal size, no palpable nodules  RESPIRATORY: Clear to auscultation bilaterally; No rales, rhonchi, wheezing  CARDIOVASCULAR: Regular rate and rhythm; No murmurs  GI: Soft, nontender, non distended, normal bowel sounds  SKIN: Dry, intact, No rashes or lesions  MUSCULOSKELETAL: moving extremities spontaneously, no peripheral edema  NEURO: sensation intact, extraocular movements intact, no tremor  PSYCH: Answering questions appropriately, normal affect, normal mood  CUSHING'S SIGNS: no striae, no acanthosis, no dorsocervical fat pad    CAPILLARY BLOOD GLUCOSE      POCT Blood Glucose.: 258 mg/dL (2024 12:31)  POCT Blood Glucose.: 188 mg/dL (2024 08:43)  POCT Blood Glucose.: 296 mg/dL (2024 21:16)  POCT Blood Glucose.: 216 mg/dL (2024 18:44)  POCT Blood Glucose.: 155 mg/dL (2024 17:28)      A1C with Estimated Average Glucose Result: 10.1 % (24 @ 02:37)                            11.5   6.85  )-----------( 218      ( 2024 02:37 )             35.3       -05    137  |  103  |  21  ----------------------------<  168<H>  4.1   |  26  |  1.15    Ca    10.4      2024 06:35  Phos  3.3     -  Mg     2.00     -    TPro  7.4  /  Alb  3.8  /  TBili  0.7  /  DBili  x   /  AST  18  /  ALT  15  /  AlkPhos  82            LIPIDS    RADIOLOGY ENDOCRINE INITIAL CONSULT - T2DM    HPI:  68F pmhx HTN, HLD, T2DM, CKD, b/l knee OA presents with worsening b/l knee pain. At baseline, pt walks without assistance. Due to OA, she has started to need to "hop" to ambulate. However, over the past 4 months, her knee pain has worsened. She now has to hold onto walls and furniture to ambulate. Over the past few days she has not been able to ambulate at all due to pain, prompting her to come to the hospital. She describes the pain as occurring in b/l knees, L>R, with throbbing, sharp pain, rated 8-9/10. She also has concurrent back pain radiating down the posterior aspect of her L leg. She endorses swelling of both knees with some warmness. Endorses subjective fevers. No loss of sensation, motor strength. No fecal or urinary incontinence (2024 15:05)      ENDOCRINE HISTORY   Diagnosed with DM: T2DM, diagnosed over 30 years ago  Last HbA1c: 10.1  Endocrinologist: does not have  Home DM Meds: previously on metformin and another medication for her kidneys but stopped taking them a few months because it was too expensive  Microvascular complications: denies retinopathy, neuropathy, nephropathy  Macrovascular complications: denies MI or CVA  SMBG: does not have glucometer  Symptoms: endorses polyuria, denies polydipsia, neuropathy  Diet at home:  - Breakfast: "whatever she wants", eggs, toast, chicken  - Lunch: salad, fruit  - Dinner: chicken, fish, turkey  - Snacks: denies  - Denies soda, endorses fresh fruit and vegetable juice  Appetite in hospital: okay  Exercise: unable to due to arthritis  PMHx: as above  PSHx: as above  Family hx: endorses mother with diabetes (unsure what type), denies family history of thyroid disease  Social hx: denies illicit substance use  Insurance: Medicare  Resides in: [facility], [place]      PAST MEDICAL & SURGICAL HISTORY:  CAD (Coronary Artery Disease)      HTN (Hypertension)      Cardiomegaly      Sickle Cell Trait      Fibroids      Hyperlipidemia      Herniated Disc  Lumbar      Obese      Diabetes mellitus      S/P  Section  times 2-1996      Status Post Umbilical Hernia Repair        Missed         Status Post Biopsy  Uterine-11      Fibroid uterus  had surgery ( 2 years ago )          FAMILY HISTORY:  No pertinent family history in first degree relatives        Social History:  Denies alcohol use, smoking, recreational drug use. Never smoker. Lives at home with daughter. 2 other children live in [place]. 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 no current record of 40 mg) (2024 15:56)  lisinopril 20 mg oral tablet: 1 tab(s) orally once a day (2024 14:55)  senna (sennosides) 8.6 mg oral tablet: 1 tab(s) orally as needed (2024 14:55)  Vitamin A: (unsure of strength and frequency) (2024 14:55)  Vitamin B: (unsure of strength and frequency) (2024 14:55)  Vitamin C: (unsure of strength and frequency) (2024 14:55)  Vitamin D: (unsure of strength and frequency) (2024 14:55)      MEDICATIONS  (STANDING):  aspirin enteric coated 81 milliGRAM(s) Oral daily  atorvastatin 40 milliGRAM(s) Oral at bedtime  dextrose 5%. 1000 milliLiter(s) (100 mL/Hr) IV Continuous <Continuous>  dextrose 5%. 1000 milliLiter(s) (50 mL/Hr) IV Continuous <Continuous>  dextrose 50% Injectable 25 Gram(s) IV Push once  dextrose 50% Injectable 25 Gram(s) IV Push once  dextrose 50% Injectable 12.5 Gram(s) IV Push once  glucagon  Injectable 1 milliGRAM(s) IntraMuscular once  heparin   Injectable 5000 Unit(s) SubCutaneous every 8 hours  influenza  Vaccine (HIGH DOSE) 0.7 milliLiter(s) IntraMuscular once  insulin lispro (ADMELOG) corrective regimen sliding scale   SubCutaneous at bedtime  insulin lispro (ADMELOG) corrective regimen sliding scale   SubCutaneous three times a day before meals  lactated ringers. 1000 milliLiter(s) (75 mL/Hr) IV Continuous <Continuous>  lidocaine   4% Patch 1 Patch Transdermal every 24 hours  multivitamin 1 Tablet(s) Oral daily  naloxone Injectable 0.4 milliGRAM(s) IV Push once  polyethylene glycol 3350 17 Gram(s) Oral daily  senna 2 Tablet(s) Oral at bedtime    MEDICATIONS  (PRN):  acetaminophen     Tablet .. 650 milliGRAM(s) Oral every 6 hours PRN Temp greater or equal to 38C (100.4F), Mild Pain (1 - 3)  aluminum hydroxide/magnesium hydroxide/simethicone Suspension 30 milliLiter(s) Oral every 4 hours PRN Dyspepsia  bisacodyl 5 milliGRAM(s) Oral daily PRN Constipation  dextrose Oral Gel 15 Gram(s) Oral once PRN Blood Glucose LESS THAN 70 milliGRAM(s)/deciliter  melatonin 3 milliGRAM(s) Oral at bedtime PRN Insomnia  ondansetron Injectable 4 milliGRAM(s) IV Push every 8 hours PRN Nausea and/or Vomiting  oxyCODONE    IR 5 milliGRAM(s) Oral every 4 hours PRN Severe Pain (7 - 10)  oxyCODONE    IR 2.5 milliGRAM(s) Oral every 4 hours PRN Moderate Pain (4 - 6)      Allergies    Sulfa (Unknown)  latex (Other; Swelling)  Sulfur (Unknown)    Intolerances        REVIEW OF SYSTEMS  Constitutional: No fever  Eyes: No blurry vision  Neuro: No tremors  HEENT: No pain  Cardiovascular: No chest pain, palpitations  Respiratory: No SOB, no cough  GI: No nausea, vomiting, abdominal pain, diarrhea, constipation  : No dysuria  Skin: no rash  Psych: no depression  Endocrine: endorses polyuria, denies polydipsia  Hem/lymph: endorses swelling  Osteoporosis: no fractures  ALL OTHER SYSTEMS REVIEWED AND NEGATIVE       PHYSICAL EXAM   Vital Signs Last 24 Hrs  T(C): 36.7 (2024 05:12), Max: 36.8 (2024 20:58)  T(F): 98.1 (2024 05:12), Max: 98.2 (2024 20:58)  HR: 74 (2024 05:12) (61 - 74)  BP: 175/97 (2024 05:12) (140/82 - 175/97)  BP(mean): --  RR: 17 (2024 05:12) (17 - 18)  SpO2: 100% (2024 05:12) (99% - 100%)    Parameters below as of 2024 05:12  Patient On (Oxygen Delivery Method): room air      GENERAL: NAD, obese  EYES: No proptosis, no lid lag, anicteric  HEENT:  Atraumatic, Normocephalic, mildly dry mucous membranes  THYROID: Normal size, no palpable nodules  RESPIRATORY: Clear to auscultation bilaterally; No rales, rhonchi, wheezing  CARDIOVASCULAR: Regular rate and rhythm; No murmurs  GI: Soft, nontender, non distended, normal bowel sounds  SKIN: Dry, intact, No rashes or lesions  MUSCULOSKELETAL: moving extremities spontaneously, no peripheral edema  NEURO: sensation intact, extraocular movements intact, no tremor  PSYCH: Answering questions appropriately, normal affect, normal mood  CUSHING'S SIGNS: no striae, no acanthosis, no dorsocervical fat pad    CAPILLARY BLOOD GLUCOSE      POCT Blood Glucose.: 258 mg/dL (2024 12:31)  POCT Blood Glucose.: 188 mg/dL (2024 08:43)  POCT Blood Glucose.: 296 mg/dL (2024 21:16)  POCT Blood Glucose.: 216 mg/dL (2024 18:44)  POCT Blood Glucose.: 155 mg/dL (2024 17:28)      A1C with Estimated Average Glucose Result: 10.1 % (24 @ 02:37)                            11.5   6.85  )-----------( 218      ( 2024 02:37 )             35.3       -05    137  |  103  |  21  ----------------------------<  168<H>  4.1   |  26  |  1.15    Ca    10.4      2024 06:35  Phos  3.3     -  Mg     2.00     -    TPro  7.4  /  Alb  3.8  /  TBili  0.7  /  DBili  x   /  AST  18  /  ALT  15  /  AlkPhos  82            LIPIDS    RADIOLOGY

## 2024-01-05 NOTE — PROGRESS NOTE ADULT - SUBJECTIVE AND OBJECTIVE BOX
DATE OF SERVICE: 24 @ 07:22    Patient is a 68y old  Female who presents with a chief complaint of knee pain (2024 15:05)      SUBJECTIVE / OVERNIGHT EVENTS:    MEDICATIONS  (STANDING):  aspirin enteric coated 81 milliGRAM(s) Oral daily  atorvastatin 40 milliGRAM(s) Oral at bedtime  dextrose 5%. 1000 milliLiter(s) (100 mL/Hr) IV Continuous <Continuous>  dextrose 5%. 1000 milliLiter(s) (50 mL/Hr) IV Continuous <Continuous>  dextrose 50% Injectable 25 Gram(s) IV Push once  dextrose 50% Injectable 25 Gram(s) IV Push once  dextrose 50% Injectable 12.5 Gram(s) IV Push once  glucagon  Injectable 1 milliGRAM(s) IntraMuscular once  heparin   Injectable 5000 Unit(s) SubCutaneous every 8 hours  influenza  Vaccine (HIGH DOSE) 0.7 milliLiter(s) IntraMuscular once  insulin lispro (ADMELOG) corrective regimen sliding scale   SubCutaneous three times a day before meals  insulin lispro (ADMELOG) corrective regimen sliding scale   SubCutaneous at bedtime  lactated ringers. 1000 milliLiter(s) (75 mL/Hr) IV Continuous <Continuous>  lidocaine   4% Patch 1 Patch Transdermal every 24 hours  multivitamin 1 Tablet(s) Oral daily  naloxone Injectable 0.4 milliGRAM(s) IV Push once  polyethylene glycol 3350 17 Gram(s) Oral daily  senna 2 Tablet(s) Oral at bedtime    MEDICATIONS  (PRN):  acetaminophen     Tablet .. 650 milliGRAM(s) Oral every 6 hours PRN Temp greater or equal to 38C (100.4F), Mild Pain (1 - 3)  aluminum hydroxide/magnesium hydroxide/simethicone Suspension 30 milliLiter(s) Oral every 4 hours PRN Dyspepsia  bisacodyl 5 milliGRAM(s) Oral daily PRN Constipation  dextrose Oral Gel 15 Gram(s) Oral once PRN Blood Glucose LESS THAN 70 milliGRAM(s)/deciliter  melatonin 3 milliGRAM(s) Oral at bedtime PRN Insomnia  ondansetron Injectable 4 milliGRAM(s) IV Push every 8 hours PRN Nausea and/or Vomiting  oxyCODONE    IR 5 milliGRAM(s) Oral every 4 hours PRN Severe Pain (7 - 10)  oxyCODONE    IR 2.5 milliGRAM(s) Oral every 4 hours PRN Moderate Pain (4 - 6)      Vital Signs Last 24 Hrs  T(C): 36.7 (2024 05:12), Max: 36.8 (2024 20:58)  T(F): 98.1 (2024 05:12), Max: 98.2 (2024 20:58)  HR: 74 (2024 05:12) (61 - 74)  BP: 175/97 (2024 05:12) (140/82 - 175/97)  BP(mean): --  RR: 17 (2024 05:12) (17 - 18)  SpO2: 100% (2024 05:12) (99% - 100%)    Parameters below as of 2024 05:12  Patient On (Oxygen Delivery Method): room air      CAPILLARY BLOOD GLUCOSE      POCT Blood Glucose.: 296 mg/dL (2024 21:16)  POCT Blood Glucose.: 216 mg/dL (2024 18:44)  POCT Blood Glucose.: 155 mg/dL (2024 17:28)  POCT Blood Glucose.: 260 mg/dL (2024 11:47)    I&O's Summary      PHYSICAL EXAM:  GENERAL: NAD, well-developed  HEAD:  Atraumatic, Normocephalic  EYES: EOMI, PERRLA, conjunctiva and sclera clear  NECK: Supple, No JVD  CHEST/LUNG: Clear to auscultation bilaterally; No wheeze  HEART: Regular rate and rhythm; No murmurs, rubs, or gallops  ABDOMEN: Soft, Nontender, Nondistended; Bowel sounds present  EXTREMITIES:  2+ Peripheral Pulses, No clubbing, cyanosis, or edema  PSYCH: AAOx3  NEUROLOGY: non-focal  SKIN: No rashes or lesions    LABS:                        11.5   6.85  )-----------( 218      ( 2024 02:37 )             35.3     01-04    138  |  103  |  19  ----------------------------<  148<H>  4.0   |  26  |  1.25    Ca    10.5      2024 02:37    TPro  7.4  /  Alb  3.8  /  TBili  0.7  /  DBili  x   /  AST  18  /  ALT  15  /  AlkPhos  82  -04          Urinalysis Basic - ( 2024 02:37 )    Color: Yellow / Appearance: Clear / S.012 / pH: x  Gluc: 148 mg/dL / Ketone: Negative mg/dL  / Bili: Negative / Urobili: 0.2 mg/dL   Blood: x / Protein: Trace mg/dL / Nitrite: Negative   Leuk Esterase: Trace / RBC: 0 /HPF / WBC 0 /HPF   Sq Epi: x / Non Sq Epi: 1 /HPF / Bacteria: Negative /HPF        RADIOLOGY & ADDITIONAL TESTS:    Imaging Personally Reviewed:    Consultant(s) Notes Reviewed:      Care Discussed with Consultants/Other Providers:   DATE OF SERVICE: 24 @ 07:22    Patient is a 68y old  Female who presents with a chief complaint of knee pain (2024 15:05)      SUBJECTIVE / OVERNIGHT EVENTS: ALPA Has been refusing insulin sliding scale, stating that she does not take anything for diabetes. I counseled her on the importance of controlling blood sugars, including the acute risk of HHS/DKA. Otherwise, her pain is controlled.    PRN use: oxy 5mg x1    MEDICATIONS  (STANDING):  aspirin enteric coated 81 milliGRAM(s) Oral daily  atorvastatin 40 milliGRAM(s) Oral at bedtime  dextrose 5%. 1000 milliLiter(s) (100 mL/Hr) IV Continuous <Continuous>  dextrose 5%. 1000 milliLiter(s) (50 mL/Hr) IV Continuous <Continuous>  dextrose 50% Injectable 25 Gram(s) IV Push once  dextrose 50% Injectable 25 Gram(s) IV Push once  dextrose 50% Injectable 12.5 Gram(s) IV Push once  glucagon  Injectable 1 milliGRAM(s) IntraMuscular once  heparin   Injectable 5000 Unit(s) SubCutaneous every 8 hours  influenza  Vaccine (HIGH DOSE) 0.7 milliLiter(s) IntraMuscular once  insulin lispro (ADMELOG) corrective regimen sliding scale   SubCutaneous three times a day before meals  insulin lispro (ADMELOG) corrective regimen sliding scale   SubCutaneous at bedtime  lactated ringers. 1000 milliLiter(s) (75 mL/Hr) IV Continuous <Continuous>  lidocaine   4% Patch 1 Patch Transdermal every 24 hours  multivitamin 1 Tablet(s) Oral daily  naloxone Injectable 0.4 milliGRAM(s) IV Push once  polyethylene glycol 3350 17 Gram(s) Oral daily  senna 2 Tablet(s) Oral at bedtime    MEDICATIONS  (PRN):  acetaminophen     Tablet .. 650 milliGRAM(s) Oral every 6 hours PRN Temp greater or equal to 38C (100.4F), Mild Pain (1 - 3)  aluminum hydroxide/magnesium hydroxide/simethicone Suspension 30 milliLiter(s) Oral every 4 hours PRN Dyspepsia  bisacodyl 5 milliGRAM(s) Oral daily PRN Constipation  dextrose Oral Gel 15 Gram(s) Oral once PRN Blood Glucose LESS THAN 70 milliGRAM(s)/deciliter  melatonin 3 milliGRAM(s) Oral at bedtime PRN Insomnia  ondansetron Injectable 4 milliGRAM(s) IV Push every 8 hours PRN Nausea and/or Vomiting  oxyCODONE    IR 5 milliGRAM(s) Oral every 4 hours PRN Severe Pain (7 - 10)  oxyCODONE    IR 2.5 milliGRAM(s) Oral every 4 hours PRN Moderate Pain (4 - 6)      Vital Signs Last 24 Hrs  T(C): 36.7 (2024 05:12), Max: 36.8 (2024 20:58)  T(F): 98.1 (2024 05:12), Max: 98.2 (2024 20:58)  HR: 74 (2024 05:12) (61 - 74)  BP: 175/97 (2024 05:12) (140/82 - 175/97)  BP(mean): --  RR: 17 (2024 05:12) (17 - 18)  SpO2: 100% (2024 05:12) (99% - 100%)    Parameters below as of 2024 05:12  Patient On (Oxygen Delivery Method): room air      CAPILLARY BLOOD GLUCOSE      POCT Blood Glucose.: 296 mg/dL (2024 21:16)  POCT Blood Glucose.: 216 mg/dL (2024 18:44)  POCT Blood Glucose.: 155 mg/dL (2024 17:28)  POCT Blood Glucose.: 260 mg/dL (2024 11:47)    I&O's Summary      PHYSICAL EXAM:  GENERAL: Well-appearing, well-nourished, NAD  EYES: EOMI, no scleral icterus  NECK: No JVD, no nodules, no carotid bruits  LUNG: CTAB, no wheezes, no rhonchi, no accessory muscle use  HEART: RRR, no m/g/r  ABDOMEN: Soft, NT, ND, no masses appreciated  EXTREMITIES:  No BLE edema, 2+ b/l AT and DP pulses  MSK: b/l knee with mild swelling, no erythema or warmth. R knee full active and passive ROM. L knee with limited active ROM (<30degrees), passive ROM limited by pain (<30 deg)  NEUROLOGY: non-focal, b/l LE sensation intact, 5/5 dorsiflexion, plantarflexion. Unable to assess knee extension/flexion 2/2 limited by pain  SKIN: No rashes or lesions    LABS:                        11.5   6.85  )-----------( 218      ( 2024 02:37 )             35.3     -    138  |  103  |  19  ----------------------------<  148<H>  4.0   |  26  |  1.25    Ca    10.5      2024 02:37    TPro  7.4  /  Alb  3.8  /  TBili  0.7  /  DBili  x   /  AST  18  /  ALT  15  /  AlkPhos  82  -          Urinalysis Basic - ( 2024 02:37 )    Color: Yellow / Appearance: Clear / S.012 / pH: x  Gluc: 148 mg/dL / Ketone: Negative mg/dL  / Bili: Negative / Urobili: 0.2 mg/dL   Blood: x / Protein: Trace mg/dL / Nitrite: Negative   Leuk Esterase: Trace / RBC: 0 /HPF / WBC 0 /HPF   Sq Epi: x / Non Sq Epi: 1 /HPF / Bacteria: Negative /HPF        RADIOLOGY & ADDITIONAL TESTS:    Imaging Personally Reviewed:    Consultant(s) Notes Reviewed:      Care Discussed with Consultants/Other Providers:

## 2024-01-05 NOTE — DISCHARGE NOTE NURSING/CASE MANAGEMENT/SOCIAL WORK - NSSCNAMETXT_GEN_ALL_CORE
Accepted by [facility] Accepted by [facility]  [facility]

## 2024-01-05 NOTE — DISCHARGE NOTE PROVIDER - CARE PROVIDER_API CALL
Lester Bashir [facility]  Cardiovascular Disease  [address], [address]  [place] [address]  Phone: [phone]  Fax: [phone]  Established Patient  Scheduled Appointment: 01/09/2024   Lester Bashir [facility]  Cardiovascular Disease  [address], [address]  [place] [address]  Phone: [phone]  Fax: [phone]  Established Patient  Scheduled Appointment: 01/09/2024   Lester Bashir [facility]  Cardiovascular Disease  [address], [address]  [place] [address]  Phone: [phone]  Fax: [phone]  Established Patient  Scheduled Appointment: 01/09/2024    Juna Guajardo  Orthopaedic Surgery  [address] [place] [address]  Phone: [phone]  Fax: [phone]  Established Patient  Follow Up Time: 1 month   Lester Bashir [facility]  Cardiovascular Disease  [address]  [place] [address]  Phone: [phone]  Fax: [phone]  Established Patient  Scheduled Appointment: 01/09/2024    Juan Guajardo  Orthopaedic Surgery  [address] [place] [address]  Phone: [phone]  Fax: [phone]  Established Patient  Follow Up Time: 1 month

## 2024-01-05 NOTE — DISCHARGE NOTE PROVIDER - HOSPITAL COURSE
68F pmhx HTN, HLD, DM (diet controlled), CKD, b/l knee OA presents with worsening b/l knee pain. At baseline, pt walks without assistance. Due to OA, she has started to need to "hop" to ambulate. However, over the past 4 months, her knee pain has worsened. She now has to hold onto walls and furniture to ambulate. Over the past few days she has not been able to ambulate at all due to pain, prompting her to come to the hospital. She describes the pain as occurring in b/l knees, L>R, with throbbing, sharp pain, rated 8-9/10. She also has concurrent back pain radiating down the posterior aspect of her L leg. She endorses swelling of both knees with some warmness. Endorses subjective fevers. No loss of sensation, motor strength. No fecal or urinary incontinence (04 Jan 2024 15:05)    Hospital course:  Pt reported improvement in pain. Seen by PT, recommending home PT.     Also found to have A1c of 10.1%. Seen by endo here. Recommending ___ on discharge. Pt to follow up with PCP      Important Medication Changes and Reason:  1.     Active or Pending Issues Requiring Follow-up:  1. Follow up with PCP for diabetes management    Advanced Directives:   [X] Full code  [ ] DNR  [ ] Hospice    Discharge Diagnoses:  1. Osteoarthritis of b/l knees  2. Uncontrolled diabetes mellitus, type 2   68F pmhx HTN, HLD, DM (diet controlled), CKD, b/l knee OA presents with worsening b/l knee pain. At baseline, pt walks without assistance. Due to OA, she has started to need to "hop" to ambulate. However, over the past 4 months, her knee pain has worsened. She now has to hold onto walls and furniture to ambulate. Over the past few days she has not been able to ambulate at all due to pain, prompting her to come to the hospital. She describes the pain as occurring in b/l knees, L>R, with throbbing, sharp pain, rated 8-9/10. She also has concurrent back pain radiating down the posterior aspect of her L leg. She endorses swelling of both knees with some warmness. Endorses subjective fevers. No loss of sensation, motor strength. No fecal or urinary incontinence (04 Jan 2024 15:05)    Hospital course:  Pt reported improvement in pain. Seen by PT, recommending home PT.     Also found to have A1c of 10.1%. Seen by endo here. Sent home  with farxiga 5mg qd and metformin ER 500mg BID on discharge. Pt to follow up with PCP      Important Medication Changes and Reason:  1. Farxiga 5mg qd  2. Metformin ER 500mg BID    Active or Pending Issues Requiring Follow-up:  1. Follow up with PCP for diabetes management    Advanced Directives:   [X] Full code  [ ] DNR  [ ] Hospice    Discharge Diagnoses:  1. Osteoarthritis of b/l knees  2. Uncontrolled diabetes mellitus, type 2

## 2024-01-05 NOTE — CHART NOTE - NSCHARTNOTEFT_GEN_A_CORE
Patient with M62.81/M17.0, will require rolling walker to perform MRADL within the home.    Vidal Mir  PGY3

## 2024-01-05 NOTE — DISCHARGE NOTE PROVIDER - NSDCCPCAREPLAN_GEN_ALL_CORE_FT
PRINCIPAL DISCHARGE DIAGNOSIS  Diagnosis: Arthritis of both knees  Assessment and Plan of Treatment: You came to the hospital for knee pain. You were found to have osteoarthritis of your knees. The best treatment for arthritis is physical therapy to help strengthen the joint and prevent pain. You can continue taking pain killers, such as Tylenol, or applying topical treatments, such as Voltaren gel or lidocaine patches. Please only use these medications as directed and do not exceed the maximum daily dose.      SECONDARY DISCHARGE DIAGNOSES  Diagnosis: Type 2 diabetes mellitus  Assessment and Plan of Treatment: Your A1c in the hospital was 10.1%, which is considered diabetes. You were seen by the endocrinologists here who specialize in diabetes. To reduce risk of heart and kidney disease, please take the prescribed medications for diabetes and see your PCP to adjust your regimen to best suit your needs.

## 2024-01-05 NOTE — DISCHARGE NOTE NURSING/CASE MANAGEMENT/SOCIAL WORK - NSDCDMETYPESERV_GEN_ALL_CORE_FT
Rolling walker to be delivered to [facility] 9N/bedside 01/05/2024. Rolling walker to be delivered to [facility] 9N/bedside 01/05/2024.

## 2024-01-06 VITALS
DIASTOLIC BLOOD PRESSURE: 65 MMHG | TEMPERATURE: 98 F | RESPIRATION RATE: 17 BRPM | HEART RATE: 75 BPM | OXYGEN SATURATION: 98 % | SYSTOLIC BLOOD PRESSURE: 121 MMHG

## 2024-01-06 LAB
GLUCOSE BLDC GLUCOMTR-MCNC: 163 MG/DL — HIGH (ref 70–99)
GLUCOSE BLDC GLUCOMTR-MCNC: 163 MG/DL — HIGH (ref 70–99)
GLUCOSE BLDC GLUCOMTR-MCNC: 165 MG/DL — HIGH (ref 70–99)
GLUCOSE BLDC GLUCOMTR-MCNC: 165 MG/DL — HIGH (ref 70–99)
GLUCOSE BLDC GLUCOMTR-MCNC: 244 MG/DL — HIGH (ref 70–99)
GLUCOSE BLDC GLUCOMTR-MCNC: 244 MG/DL — HIGH (ref 70–99)

## 2024-01-06 PROCEDURE — 99239 HOSP IP/OBS DSCHRG MGMT >30: CPT | Mod: GC

## 2024-01-06 RX ORDER — POLYETHYLENE GLYCOL 3350 17 G/17G
17 POWDER, FOR SOLUTION ORAL
Qty: 510 | Refills: 0
Start: 2024-01-06 | End: 2024-02-04

## 2024-01-06 RX ORDER — SENNA PLUS 8.6 MG/1
1 TABLET ORAL
Qty: 0 | Refills: 0 | DISCHARGE

## 2024-01-06 RX ORDER — METFORMIN HYDROCHLORIDE 850 MG/1
1 TABLET ORAL
Qty: 60 | Refills: 0
Start: 2024-01-06 | End: 2024-02-04

## 2024-01-06 RX ORDER — ISOPROPYL ALCOHOL, BENZOCAINE .7; .06 ML/ML; ML/ML
0 SWAB TOPICAL
Qty: 100 | Refills: 1
Start: 2024-01-06

## 2024-01-06 RX ORDER — DAPAGLIFLOZIN 10 MG/1
1 TABLET, FILM COATED ORAL
Qty: 30 | Refills: 0
Start: 2024-01-06 | End: 2024-02-04

## 2024-01-06 RX ORDER — LIDOCAINE 4 G/100G
1 CREAM TOPICAL
Qty: 30 | Refills: 0
Start: 2024-01-06 | End: 2024-02-04

## 2024-01-06 RX ORDER — ACETAMINOPHEN 500 MG
2 TABLET ORAL
Qty: 240 | Refills: 0
Start: 2024-01-06 | End: 2024-02-04

## 2024-01-06 RX ORDER — OXYCODONE HYDROCHLORIDE 5 MG/1
1 TABLET ORAL
Qty: 16 | Refills: 0
Start: 2024-01-06 | End: 2024-01-09

## 2024-01-06 RX ORDER — NALOXONE HYDROCHLORIDE 4 MG/.1ML
4 SPRAY NASAL
Qty: 1 | Refills: 0
Start: 2024-01-06

## 2024-01-06 RX ADMIN — LISINOPRIL 20 MILLIGRAM(S): 2.5 TABLET ORAL at 05:40

## 2024-01-06 RX ADMIN — Medication 1: at 08:45

## 2024-01-06 RX ADMIN — Medication 1: at 18:07

## 2024-01-06 RX ADMIN — Medication 3 UNIT(S): at 08:45

## 2024-01-06 RX ADMIN — Medication 2: at 13:04

## 2024-01-06 RX ADMIN — Medication 1 TABLET(S): at 11:06

## 2024-01-06 RX ADMIN — HEPARIN SODIUM 5000 UNIT(S): 5000 INJECTION INTRAVENOUS; SUBCUTANEOUS at 13:17

## 2024-01-06 RX ADMIN — POLYETHYLENE GLYCOL 3350 17 GRAM(S): 17 POWDER, FOR SOLUTION ORAL at 11:05

## 2024-01-06 RX ADMIN — Medication 3 UNIT(S): at 18:07

## 2024-01-06 RX ADMIN — Medication 81 MILLIGRAM(S): at 11:06

## 2024-01-06 RX ADMIN — HEPARIN SODIUM 5000 UNIT(S): 5000 INJECTION INTRAVENOUS; SUBCUTANEOUS at 05:40

## 2024-01-06 RX ADMIN — Medication 3 UNIT(S): at 13:04

## 2024-01-06 NOTE — PROGRESS NOTE ADULT - PROBLEM SELECTOR PLAN 8
DVT ppx: Lovenox  Diet: carb consistent  Dispo: pending PT eval DVT ppx: Lovenox  Diet: carb consistent  Dispo: home w/ home PT, today

## 2024-01-06 NOTE — PROGRESS NOTE ADULT - PROBLEM SELECTOR PLAN 4
- reports diet controlled; stopped diabetes meds  - A1c 1/4/24 10.1%  - FS/ELIZABETH  - endo consult for uncontrolled DM  - will need to assess insulin requirements after she starts accepting the insulin here - reports diet controlled; stopped diabetes meds  - A1c 1/4/24 10.1%  - FS/ELIAZBETH  - endo consult for uncontrolled DM  - will need to assess insulin requirements after she starts accepting the insulin here

## 2024-01-06 NOTE — PROGRESS NOTE ADULT - ASSESSMENT
68F pmhx HTN, HLD, DM (diet controlled), CKD, b/l knee OA presents with worsening b/l knee pain. Arthocentesis w/o evidence of gout or septic arthritis. Overall c/f worsening of OA vs. possible component of radiculopathy
68F pmhx HTN, HLD, DM (diet controlled), CKD, b/l knee OA presents with worsening b/l knee pain. Arthocentesis w/o evidence of gout or septic arthritis. Overall c/f worsening of OA vs. possible component of radiculopathy

## 2024-01-06 NOTE — DIETITIAN INITIAL EVALUATION ADULT - OTHER CALCULATIONS
Ideal Body Weight: 125 lbs / 56.8 kg +/-10%   Dosing weight (1/4) 196.2 lbs / 89 kg.  No recent weight history available via chart.

## 2024-01-06 NOTE — PROGRESS NOTE ADULT - SUBJECTIVE AND OBJECTIVE BOX
DATE OF SERVICE: 01-06-24 @ 07:13    Patient is a 68y old  Female who presents with a chief complaint of knee pain (05 Jan 2024 13:24)      SUBJECTIVE / OVERNIGHT EVENTS:    MEDICATIONS  (STANDING):  aspirin enteric coated 81 milliGRAM(s) Oral daily  atorvastatin 40 milliGRAM(s) Oral at bedtime  dextrose 5%. 1000 milliLiter(s) (50 mL/Hr) IV Continuous <Continuous>  dextrose 5%. 1000 milliLiter(s) (100 mL/Hr) IV Continuous <Continuous>  dextrose 50% Injectable 25 Gram(s) IV Push once  dextrose 50% Injectable 12.5 Gram(s) IV Push once  dextrose 50% Injectable 25 Gram(s) IV Push once  glucagon  Injectable 1 milliGRAM(s) IntraMuscular once  heparin   Injectable 5000 Unit(s) SubCutaneous every 8 hours  influenza  Vaccine (HIGH DOSE) 0.7 milliLiter(s) IntraMuscular once  insulin glargine Injectable (LANTUS) 10 Unit(s) SubCutaneous at bedtime  insulin lispro (ADMELOG) corrective regimen sliding scale   SubCutaneous at bedtime  insulin lispro (ADMELOG) corrective regimen sliding scale   SubCutaneous three times a day before meals  insulin lispro Injectable (ADMELOG) 3 Unit(s) SubCutaneous three times a day before meals  lactated ringers. 1000 milliLiter(s) (75 mL/Hr) IV Continuous <Continuous>  lidocaine   4% Patch 1 Patch Transdermal every 24 hours  lisinopril 20 milliGRAM(s) Oral daily  multivitamin 1 Tablet(s) Oral daily  naloxone Injectable 0.4 milliGRAM(s) IV Push once  polyethylene glycol 3350 17 Gram(s) Oral daily  senna 2 Tablet(s) Oral at bedtime    MEDICATIONS  (PRN):  acetaminophen     Tablet .. 650 milliGRAM(s) Oral every 6 hours PRN Temp greater or equal to 38C (100.4F), Mild Pain (1 - 3)  aluminum hydroxide/magnesium hydroxide/simethicone Suspension 30 milliLiter(s) Oral every 4 hours PRN Dyspepsia  bisacodyl 5 milliGRAM(s) Oral daily PRN Constipation  dextrose Oral Gel 15 Gram(s) Oral once PRN Blood Glucose LESS THAN 70 milliGRAM(s)/deciliter  melatonin 3 milliGRAM(s) Oral at bedtime PRN Insomnia  ondansetron Injectable 4 milliGRAM(s) IV Push every 8 hours PRN Nausea and/or Vomiting  oxyCODONE    IR 2.5 milliGRAM(s) Oral every 4 hours PRN Moderate Pain (4 - 6)  oxyCODONE    IR 5 milliGRAM(s) Oral every 4 hours PRN Severe Pain (7 - 10)      Vital Signs Last 24 Hrs  T(C): 36.4 (06 Jan 2024 05:43), Max: 36.7 (05 Jan 2024 13:20)  T(F): 97.5 (06 Jan 2024 05:43), Max: 98 (05 Jan 2024 13:20)  HR: 72 (06 Jan 2024 05:43) (72 - 77)  BP: 157/80 (06 Jan 2024 05:43) (151/77 - 174/92)  BP(mean): --  RR: 17 (06 Jan 2024 05:43) (17 - 18)  SpO2: 98% (06 Jan 2024 05:43) (98% - 100%)    Parameters below as of 06 Jan 2024 05:43  Patient On (Oxygen Delivery Method): room air      CAPILLARY BLOOD GLUCOSE      POCT Blood Glucose.: 222 mg/dL (05 Jan 2024 21:22)  POCT Blood Glucose.: 174 mg/dL (05 Jan 2024 17:59)  POCT Blood Glucose.: 258 mg/dL (05 Jan 2024 12:31)  POCT Blood Glucose.: 188 mg/dL (05 Jan 2024 08:43)    I&O's Summary      PHYSICAL EXAM:  GENERAL: NAD, well-developed  HEAD:  Atraumatic, Normocephalic  EYES: EOMI, PERRLA, conjunctiva and sclera clear  NECK: Supple, No JVD  CHEST/LUNG: Clear to auscultation bilaterally; No wheeze  HEART: Regular rate and rhythm; No murmurs, rubs, or gallops  ABDOMEN: Soft, Nontender, Nondistended; Bowel sounds present  EXTREMITIES:  2+ Peripheral Pulses, No clubbing, cyanosis, or edema  PSYCH: AAOx3  NEUROLOGY: non-focal  SKIN: No rashes or lesions    LABS:    01-05    137  |  103  |  21  ----------------------------<  168<H>  4.1   |  26  |  1.15    Ca    10.4      05 Jan 2024 06:35  Phos  3.3     01-05  Mg     2.00     01-05            Urinalysis Basic - ( 05 Jan 2024 06:35 )    Color: x / Appearance: x / SG: x / pH: x  Gluc: 168 mg/dL / Ketone: x  / Bili: x / Urobili: x   Blood: x / Protein: x / Nitrite: x   Leuk Esterase: x / RBC: x / WBC x   Sq Epi: x / Non Sq Epi: x / Bacteria: x        RADIOLOGY & ADDITIONAL TESTS:    Imaging Personally Reviewed:    Consultant(s) Notes Reviewed:      Care Discussed with Consultants/Other Providers:   DATE OF SERVICE: 01-06-24 @ 07:13    Patient is a 68y old  Female who presents with a chief complaint of knee pain (05 Jan 2024 13:24)      SUBJECTIVE / OVERNIGHT EVENTS: NAOE. Pain is present but manageable. Can stand with difficulty    MEDICATIONS  (STANDING):  aspirin enteric coated 81 milliGRAM(s) Oral daily  atorvastatin 40 milliGRAM(s) Oral at bedtime  dextrose 5%. 1000 milliLiter(s) (50 mL/Hr) IV Continuous <Continuous>  dextrose 5%. 1000 milliLiter(s) (100 mL/Hr) IV Continuous <Continuous>  dextrose 50% Injectable 25 Gram(s) IV Push once  dextrose 50% Injectable 12.5 Gram(s) IV Push once  dextrose 50% Injectable 25 Gram(s) IV Push once  glucagon  Injectable 1 milliGRAM(s) IntraMuscular once  heparin   Injectable 5000 Unit(s) SubCutaneous every 8 hours  influenza  Vaccine (HIGH DOSE) 0.7 milliLiter(s) IntraMuscular once  insulin glargine Injectable (LANTUS) 10 Unit(s) SubCutaneous at bedtime  insulin lispro (ADMELOG) corrective regimen sliding scale   SubCutaneous at bedtime  insulin lispro (ADMELOG) corrective regimen sliding scale   SubCutaneous three times a day before meals  insulin lispro Injectable (ADMELOG) 3 Unit(s) SubCutaneous three times a day before meals  lactated ringers. 1000 milliLiter(s) (75 mL/Hr) IV Continuous <Continuous>  lidocaine   4% Patch 1 Patch Transdermal every 24 hours  lisinopril 20 milliGRAM(s) Oral daily  multivitamin 1 Tablet(s) Oral daily  naloxone Injectable 0.4 milliGRAM(s) IV Push once  polyethylene glycol 3350 17 Gram(s) Oral daily  senna 2 Tablet(s) Oral at bedtime    MEDICATIONS  (PRN):  acetaminophen     Tablet .. 650 milliGRAM(s) Oral every 6 hours PRN Temp greater or equal to 38C (100.4F), Mild Pain (1 - 3)  aluminum hydroxide/magnesium hydroxide/simethicone Suspension 30 milliLiter(s) Oral every 4 hours PRN Dyspepsia  bisacodyl 5 milliGRAM(s) Oral daily PRN Constipation  dextrose Oral Gel 15 Gram(s) Oral once PRN Blood Glucose LESS THAN 70 milliGRAM(s)/deciliter  melatonin 3 milliGRAM(s) Oral at bedtime PRN Insomnia  ondansetron Injectable 4 milliGRAM(s) IV Push every 8 hours PRN Nausea and/or Vomiting  oxyCODONE    IR 2.5 milliGRAM(s) Oral every 4 hours PRN Moderate Pain (4 - 6)  oxyCODONE    IR 5 milliGRAM(s) Oral every 4 hours PRN Severe Pain (7 - 10)      Vital Signs Last 24 Hrs  T(C): 36.4 (06 Jan 2024 05:43), Max: 36.7 (05 Jan 2024 13:20)  T(F): 97.5 (06 Jan 2024 05:43), Max: 98 (05 Jan 2024 13:20)  HR: 72 (06 Jan 2024 05:43) (72 - 77)  BP: 157/80 (06 Jan 2024 05:43) (151/77 - 174/92)  BP(mean): --  RR: 17 (06 Jan 2024 05:43) (17 - 18)  SpO2: 98% (06 Jan 2024 05:43) (98% - 100%)    Parameters below as of 06 Jan 2024 05:43  Patient On (Oxygen Delivery Method): room air      CAPILLARY BLOOD GLUCOSE      POCT Blood Glucose.: 222 mg/dL (05 Jan 2024 21:22)  POCT Blood Glucose.: 174 mg/dL (05 Jan 2024 17:59)  POCT Blood Glucose.: 258 mg/dL (05 Jan 2024 12:31)  POCT Blood Glucose.: 188 mg/dL (05 Jan 2024 08:43)    I&O's Summary      PHYSICAL EXAM:  GENERAL: Well-appearing, well-nourished, NAD  EYES: EOMI, no scleral icterus  NECK: No JVD, no nodules, no carotid bruits  LUNG: CTAB, no wheezes, no rhonchi, no accessory muscle use  HEART: RRR, no m/g/r  ABDOMEN: Soft, NT, ND, no masses appreciated  EXTREMITIES:  No BLE edema, 2+ b/l AT and DP pulses  MSK: b/l knee with mild swelling, no erythema or warmth. R knee full active and passive ROM. L knee with limited active ROM (<30degrees), passive ROM limited by pain (<30 deg)  NEUROLOGY: non-focal, b/l LE sensation intact, 5/5 dorsiflexion, plantarflexion. Unable to assess knee extension/flexion 2/2 limited by pain  SKIN: No rashes or lesions    LABS:    01-05    137  |  103  |  21  ----------------------------<  168<H>  4.1   |  26  |  1.15    Ca    10.4      05 Jan 2024 06:35  Phos  3.3     01-05  Mg     2.00     01-05            Urinalysis Basic - ( 05 Jan 2024 06:35 )    Color: x / Appearance: x / SG: x / pH: x  Gluc: 168 mg/dL / Ketone: x  / Bili: x / Urobili: x   Blood: x / Protein: x / Nitrite: x   Leuk Esterase: x / RBC: x / WBC x   Sq Epi: x / Non Sq Epi: x / Bacteria: x        RADIOLOGY & ADDITIONAL TESTS:    Imaging Personally Reviewed:    Consultant(s) Notes Reviewed:      Care Discussed with Consultants/Other Providers:

## 2024-01-06 NOTE — DIETITIAN INITIAL EVALUATION ADULT - PERSON TAUGHT/METHOD
Pt provided with written and verbal nutrition education on type 2 DM diet. Topics include: MyPlate healthy eating guidelines, avoidance of sugar sweetened beverages and recommended alternatives, label reading, sources of carbohydrates, carbohydrate counting, complex vs simple carbohydrates, inclusion of whole grains and fiber, mixed meals (pairing protein with carbohydrate for optimal blood glucose response), hypoglycemia prevention/management and timing of meals/snacks. Discussed importance of self monitoring blood glucose and encouraged outpatient endocrinology follow up. Pt verbalized understanding of nutrition education.

## 2024-01-06 NOTE — DIETITIAN INITIAL EVALUATION ADULT - NAME AND PHONE
Suzanne Thompson RDN, CDN #[id number]  Also available on [employer] Teams  Suzanne Thompson RDN, CDN #[id number]  Also available on [employer] Teams

## 2024-01-06 NOTE — DIETITIAN INITIAL EVALUATION ADULT - OTHER INFO
Per chart, pt is 68 year old female PMH HTN, HLD, type 2 DM, CKD, bilateral knee OA presenting with worsening knee pain. Arthrocentesis without evidence of gout or septic arthritis, concerning for worsening of OA vs. possible component of radiculopathy.     Pt confirms NKFA, denies difficulties chewing/swallowing. Pt lives at home with daughter, denies issues with access to/preparation of food PTA. Pt reports generally good appetite/PO intake, adheres to therapeutic diet however intake notable for homemade juices. History of type 2 DM for >30 years. Pt was on Metformin however self discontinued months PTA secondary to expense and side effects, pt felt she was managing well with her diet. Current HbA1c (1/4) 10.1%.     Pt continues to eat well in house despite disliking institutional menu items. Pt tolerating diet, denies current GI distress. Unknown last BM; ordered for senna 2 tablets qHS, Miralax 17 gm qD and bisacodyl 5 mg qD PRN. Endocrinology following, discharge plan to be determined as pt does not want insulin. Fingersticks improving.

## 2024-01-06 NOTE — CHART NOTE - NSCHARTNOTEFT_GEN_A_CORE
Endocrinology following Ms. Bonner for T2DM. BG in target range this morning.    Recommend  - continue lantus 10 units daily at bedtime  - continue admelog 3 units tid premeal  - low admelog correction scale with meals  - separate low admelog correction scale at bedtime  - target bg 100-180  - hypoglycemia protocol prn  - carb consisten diet  - RD consult  - provider to RN for glucometer teaching    For discharge: patient agreeable with copays for farxiga 5mg daily and metformin 500mg twice daily, ensure has glucometer, test strips and lancets. Patient reports will follow up with PCP for further management and did not want us to schedule an appointment at this time.    Gurinder Rice MD  Endocrinology Fellow

## 2024-01-06 NOTE — DIETITIAN INITIAL EVALUATION ADULT - PERTINENT MEDS FT
atorvastatin  LANTUS 10U qHS  ADMELOG corrective regimen sliding scale  ADMELOG 3U TIDac   lactated ringers IV Continuous  lisinopril  multivitamin   polyethylene glycol   senna  aluminum hydroxide/magnesium hydroxide/simethicone Suspension PRN  bisacodyl PRN  ondansetron IV PRN

## 2024-01-06 NOTE — PROGRESS NOTE ADULT - ATTENDING COMMENTS
69 yo F with PMH HTN, HLD, DM (diet controlled), CKD, b/l knee OA presents with acute on chronic b/l knee pain.     #Osteoarthritis   - B/l knee xrays completed, no acute fracture or dislocation. Bilateral knee joint arthrosis.  - Arthocentesis likely c/w with mild inflammatory disease w/o evidence of gout or septic arthritis.  - pain regimen with tylenol and oxy   - PT eval: home pt     #Sciatica   - Also has back pain that radiates down L posterior leg   - Noted history of spinal fusion  - pain control as above   - continue home PT     #Uncontrolled T2DM   - noted to have A1c >10.  - endo consult, pending final recs     Rest as above   Discussed with HS
68F HTN, DM2 A1c 10, CKD, p/w acute exacerbation of OA w/ gait disturbance c/b hyperglycemia.  - pain control with tylenol  - recommend outpatient evaluation with orthopedic surgery via PMD.  - endo consult apprecicated; Metformin and Farxiga.  - Patient medically optimized for discharge.  Discharge planning 40 minutes - discussed with patient and consultants.

## 2024-01-06 NOTE — PROGRESS NOTE ADULT - PROBLEM SELECTOR PLAN 5
- reports no h/o stents  - stable  - c/w home lisinopril 20 qd, lipitor 40mg qd, aspirin 81mg qd
- reports no h/o stents  - stable  - c/w home lisinopril 20 qd, lipitor 40mg qd, aspirin 81mg qd

## 2024-01-06 NOTE — PROGRESS NOTE ADULT - PROBLEM SELECTOR PLAN 1
- now has difficulty ambulating  - arthrocentesis negative for gout and septic arthritis  - suspect worsening of OA  - pain regimen: mild - tylenol 650mg q6h, moderate - oxy 2.5mg q4h, severe - oxy 5mg q4h  - PT dell - now has difficulty ambulating  - arthrocentesis negative for gout and septic arthritis  - suspect worsening of OA  - pain regimen: mild - tylenol 650mg q6h, moderate - oxy 2.5mg q4h, severe - oxy 5mg q4h  - PT eval - home PT

## 2024-01-06 NOTE — PROGRESS NOTE ADULT - PROBLEM SELECTOR PLAN 6
- baseline SCr 0.9-1.1?  - approx at baseline  - avoid nephrotoxins  - c/w lisinopril 20mg qd
- baseline SCr 0.9-1.1?  - approx at baseline  - avoid nephrotoxins  - c/w lisinopril 20mg qd

## 2024-01-09 LAB
CULTURE RESULTS: SIGNIFICANT CHANGE UP
CULTURE RESULTS: SIGNIFICANT CHANGE UP
SPECIMEN SOURCE: SIGNIFICANT CHANGE UP
SPECIMEN SOURCE: SIGNIFICANT CHANGE UP

## 2024-11-10 ENCOUNTER — EMERGENCY (EMERGENCY)
Facility: HOSPITAL | Age: 69
LOS: 1 days | Discharge: HOME | End: 2024-11-10
Attending: EMERGENCY MEDICINE | Admitting: EMERGENCY MEDICINE
Payer: MEDICARE

## 2024-11-10 VITALS
WEIGHT: 169.98 LBS | SYSTOLIC BLOOD PRESSURE: 196 MMHG | HEART RATE: 85 BPM | RESPIRATION RATE: 16 BRPM | DIASTOLIC BLOOD PRESSURE: 96 MMHG | OXYGEN SATURATION: 98 % | TEMPERATURE: 99 F | HEIGHT: 65 IN

## 2024-11-10 DIAGNOSIS — D25.9 LEIOMYOMA OF UTERUS, UNSPECIFIED: Chronic | ICD-10-CM

## 2024-11-10 LAB
ALBUMIN SERPL ELPH-MCNC: 4 G/DL — SIGNIFICANT CHANGE UP (ref 3.3–5)
ALP SERPL-CCNC: 74 U/L — SIGNIFICANT CHANGE UP (ref 40–120)
ALT FLD-CCNC: 17 U/L — SIGNIFICANT CHANGE UP (ref 4–33)
ANION GAP SERPL CALC-SCNC: 14 MMOL/L — SIGNIFICANT CHANGE UP (ref 7–14)
APPEARANCE CSF: CLEAR — SIGNIFICANT CHANGE UP
APPEARANCE SPUN FLD: COLORLESS — SIGNIFICANT CHANGE UP
APPEARANCE UR: CLEAR — SIGNIFICANT CHANGE UP
APTT BLD: 38.4 SEC — HIGH (ref 24.5–35.6)
AST SERPL-CCNC: 37 U/L — HIGH (ref 4–32)
BACTERIA # UR AUTO: NEGATIVE /HPF — SIGNIFICANT CHANGE UP
BASOPHILS # BLD AUTO: 0.03 K/UL — SIGNIFICANT CHANGE UP (ref 0–0.2)
BASOPHILS NFR BLD AUTO: 0.4 % — SIGNIFICANT CHANGE UP (ref 0–2)
BILIRUB SERPL-MCNC: 0.6 MG/DL — SIGNIFICANT CHANGE UP (ref 0.2–1.2)
BILIRUB UR-MCNC: NEGATIVE — SIGNIFICANT CHANGE UP
BUN SERPL-MCNC: 18 MG/DL — SIGNIFICANT CHANGE UP (ref 7–23)
CALCIUM SERPL-MCNC: 11 MG/DL — HIGH (ref 8.4–10.5)
CAST: 0 /LPF — SIGNIFICANT CHANGE UP (ref 0–4)
CHLORIDE SERPL-SCNC: 99 MMOL/L — SIGNIFICANT CHANGE UP (ref 98–107)
CO2 SERPL-SCNC: 25 MMOL/L — SIGNIFICANT CHANGE UP (ref 22–31)
COLOR CSF: COLORLESS — SIGNIFICANT CHANGE UP
COLOR SPEC: YELLOW — SIGNIFICANT CHANGE UP
CREAT SERPL-MCNC: 1.3 MG/DL — SIGNIFICANT CHANGE UP (ref 0.5–1.3)
DIFF PNL FLD: NEGATIVE — SIGNIFICANT CHANGE UP
EGFR: 45 ML/MIN/1.73M2 — LOW
EOSINOPHIL # BLD AUTO: 0.32 K/UL — SIGNIFICANT CHANGE UP (ref 0–0.5)
EOSINOPHIL NFR BLD AUTO: 3.8 % — SIGNIFICANT CHANGE UP (ref 0–6)
FLUAV AG NPH QL: SIGNIFICANT CHANGE UP
FLUBV AG NPH QL: SIGNIFICANT CHANGE UP
GLUCOSE CSF-MCNC: 87 MG/DL — HIGH (ref 40–70)
GLUCOSE SERPL-MCNC: 117 MG/DL — HIGH (ref 70–99)
GLUCOSE UR QL: >=1000 MG/DL
GRAM STN FLD: SIGNIFICANT CHANGE UP
HCT VFR BLD CALC: 40.9 % — SIGNIFICANT CHANGE UP (ref 34.5–45)
HGB BLD-MCNC: 13.4 G/DL — SIGNIFICANT CHANGE UP (ref 11.5–15.5)
IANC: 4.88 K/UL — SIGNIFICANT CHANGE UP (ref 1.8–7.4)
IMM GRANULOCYTES NFR BLD AUTO: 0.2 % — SIGNIFICANT CHANGE UP (ref 0–0.9)
INR BLD: 1.02 RATIO — SIGNIFICANT CHANGE UP (ref 0.85–1.16)
KETONES UR-MCNC: NEGATIVE MG/DL — SIGNIFICANT CHANGE UP
LEUKOCYTE ESTERASE UR-ACNC: NEGATIVE — SIGNIFICANT CHANGE UP
LYMPHOCYTES # BLD AUTO: 2.48 K/UL — SIGNIFICANT CHANGE UP (ref 1–3.3)
LYMPHOCYTES # BLD AUTO: 29.1 % — SIGNIFICANT CHANGE UP (ref 13–44)
LYMPHOCYTES # CSF: 48 % — SIGNIFICANT CHANGE UP
MAGNESIUM SERPL-MCNC: 2.3 MG/DL — SIGNIFICANT CHANGE UP (ref 1.6–2.6)
MCHC RBC-ENTMCNC: 26.4 PG — LOW (ref 27–34)
MCHC RBC-ENTMCNC: 32.8 G/DL — SIGNIFICANT CHANGE UP (ref 32–36)
MCV RBC AUTO: 80.7 FL — SIGNIFICANT CHANGE UP (ref 80–100)
MONOCYTES # BLD AUTO: 0.8 K/UL — SIGNIFICANT CHANGE UP (ref 0–0.9)
MONOCYTES NFR BLD AUTO: 9.4 % — SIGNIFICANT CHANGE UP (ref 2–14)
MONOS+MACROS NFR CSF: 52 % — SIGNIFICANT CHANGE UP
NEUTROPHILS # BLD AUTO: 4.88 K/UL — SIGNIFICANT CHANGE UP (ref 1.8–7.4)
NEUTROPHILS # CSF: 0 % — SIGNIFICANT CHANGE UP
NEUTROPHILS NFR BLD AUTO: 57.1 % — SIGNIFICANT CHANGE UP (ref 43–77)
NITRITE UR-MCNC: NEGATIVE — SIGNIFICANT CHANGE UP
NRBC # BLD: 0 /100 WBCS — SIGNIFICANT CHANGE UP (ref 0–0)
NRBC # FLD: 0.02 K/UL — HIGH (ref 0–0)
NRBC NFR CSF: 1 CELLS/UL — SIGNIFICANT CHANGE UP (ref 0–5)
PH UR: 7 — SIGNIFICANT CHANGE UP (ref 5–8)
PHOSPHATE SERPL-MCNC: 2.8 MG/DL — SIGNIFICANT CHANGE UP (ref 2.5–4.5)
PLATELET # BLD AUTO: 244 K/UL — SIGNIFICANT CHANGE UP (ref 150–400)
POTASSIUM SERPL-MCNC: 4.8 MMOL/L — SIGNIFICANT CHANGE UP (ref 3.5–5.3)
POTASSIUM SERPL-SCNC: 4.8 MMOL/L — SIGNIFICANT CHANGE UP (ref 3.5–5.3)
PROT CSF-MCNC: 41 MG/DL — SIGNIFICANT CHANGE UP (ref 15–45)
PROT SERPL-MCNC: 8.6 G/DL — HIGH (ref 6–8.3)
PROT UR-MCNC: NEGATIVE MG/DL — SIGNIFICANT CHANGE UP
PROTHROM AB SERPL-ACNC: 11.8 SEC — SIGNIFICANT CHANGE UP (ref 9.9–13.4)
RBC # BLD: 5.07 M/UL — SIGNIFICANT CHANGE UP (ref 3.8–5.2)
RBC # CSF: 113 CELLS/UL — HIGH (ref 0–0)
RBC # FLD: 14.8 % — HIGH (ref 10.3–14.5)
RBC CASTS # UR COMP ASSIST: 0 /HPF — SIGNIFICANT CHANGE UP (ref 0–4)
RSV RNA NPH QL NAA+NON-PROBE: SIGNIFICANT CHANGE UP
SARS-COV-2 RNA SPEC QL NAA+PROBE: SIGNIFICANT CHANGE UP
SODIUM SERPL-SCNC: 138 MMOL/L — SIGNIFICANT CHANGE UP (ref 135–145)
SP GR SPEC: 1.02 — SIGNIFICANT CHANGE UP (ref 1–1.03)
SPECIMEN SOURCE: SIGNIFICANT CHANGE UP
SQUAMOUS # UR AUTO: 0 /HPF — SIGNIFICANT CHANGE UP (ref 0–5)
TOTAL CELLS COUNTED, SPINAL FLUID: 21 CELLS — SIGNIFICANT CHANGE UP
TUBE TYPE: SIGNIFICANT CHANGE UP
UROBILINOGEN FLD QL: 0.2 MG/DL — SIGNIFICANT CHANGE UP (ref 0.2–1)
WBC # BLD: 8.53 K/UL — SIGNIFICANT CHANGE UP (ref 3.8–10.5)
WBC # FLD AUTO: 8.53 K/UL — SIGNIFICANT CHANGE UP (ref 3.8–10.5)
WBC UR QL: 1 /HPF — SIGNIFICANT CHANGE UP (ref 0–5)

## 2024-11-10 PROCEDURE — 71045 X-RAY EXAM CHEST 1 VIEW: CPT | Mod: 26

## 2024-11-10 PROCEDURE — 62270 DX LMBR SPI PNXR: CPT

## 2024-11-10 PROCEDURE — 70450 CT HEAD/BRAIN W/O DYE: CPT | Mod: 26,MC

## 2024-11-10 PROCEDURE — 93010 ELECTROCARDIOGRAM REPORT: CPT

## 2024-11-10 PROCEDURE — 99285 EMERGENCY DEPT VISIT HI MDM: CPT | Mod: 25

## 2024-11-10 RX ORDER — METHOCARBAMOL 500 MG/1
750 TABLET ORAL ONCE
Refills: 0 | Status: COMPLETED | OUTPATIENT
Start: 2024-11-10 | End: 2024-11-10

## 2024-11-10 RX ORDER — SODIUM CHLORIDE 9 MG/ML
1000 INJECTION, SOLUTION INTRAMUSCULAR; INTRAVENOUS; SUBCUTANEOUS ONCE
Refills: 0 | Status: COMPLETED | OUTPATIENT
Start: 2024-11-10 | End: 2024-11-10

## 2024-11-10 RX ORDER — SODIUM CHLORIDE 9 MG/ML
500 INJECTION, SOLUTION INTRAMUSCULAR; INTRAVENOUS; SUBCUTANEOUS ONCE
Refills: 0 | Status: COMPLETED | OUTPATIENT
Start: 2024-11-10 | End: 2024-11-10

## 2024-11-10 RX ORDER — KETOROLAC TROMETHAMINE 30 MG/ML
15 INJECTION INTRAMUSCULAR; INTRAVENOUS ONCE
Refills: 0 | Status: DISCONTINUED | OUTPATIENT
Start: 2024-11-10 | End: 2024-11-10

## 2024-11-10 RX ORDER — METOCLOPRAMIDE HCL 10 MG
10 TABLET ORAL ONCE
Refills: 0 | Status: COMPLETED | OUTPATIENT
Start: 2024-11-10 | End: 2024-11-10

## 2024-11-10 RX ORDER — SODIUM CHLORIDE 9 MG/ML
1000 INJECTION, SOLUTION INTRAMUSCULAR; INTRAVENOUS; SUBCUTANEOUS ONCE
Refills: 0 | Status: DISCONTINUED | OUTPATIENT
Start: 2024-11-10 | End: 2024-11-10

## 2024-11-10 RX ORDER — MORPHINE SULFATE 30 MG/1
4 TABLET, EXTENDED RELEASE ORAL ONCE
Refills: 0 | Status: DISCONTINUED | OUTPATIENT
Start: 2024-11-10 | End: 2024-11-10

## 2024-11-10 RX ORDER — LIDOCAINE HYDROCHLORIDE 40 MG/ML
2 SOLUTION TOPICAL ONCE
Refills: 0 | Status: COMPLETED | OUTPATIENT
Start: 2024-11-10 | End: 2024-11-10

## 2024-11-10 RX ORDER — ACETAMINOPHEN 500 MG
1000 TABLET ORAL ONCE
Refills: 0 | Status: COMPLETED | OUTPATIENT
Start: 2024-11-10 | End: 2024-11-10

## 2024-11-10 RX ORDER — MAGNESIUM SULFATE IN 0.9% NACL 2 G/50 ML
1 INTRAVENOUS SOLUTION, PIGGYBACK (ML) INTRAVENOUS ONCE
Refills: 0 | Status: COMPLETED | OUTPATIENT
Start: 2024-11-10 | End: 2024-11-10

## 2024-11-10 RX ADMIN — Medication 1000 MILLIGRAM(S): at 18:55

## 2024-11-10 RX ADMIN — SODIUM CHLORIDE 1000 MILLILITER(S): 9 INJECTION, SOLUTION INTRAMUSCULAR; INTRAVENOUS; SUBCUTANEOUS at 23:27

## 2024-11-10 RX ADMIN — Medication 100 GRAM(S): at 21:13

## 2024-11-10 RX ADMIN — SODIUM CHLORIDE 500 MILLILITER(S): 9 INJECTION, SOLUTION INTRAMUSCULAR; INTRAVENOUS; SUBCUTANEOUS at 18:19

## 2024-11-10 RX ADMIN — Medication 10 MILLIGRAM(S): at 18:13

## 2024-11-10 RX ADMIN — KETOROLAC TROMETHAMINE 15 MILLIGRAM(S): 30 INJECTION INTRAMUSCULAR; INTRAVENOUS at 23:57

## 2024-11-10 RX ADMIN — MORPHINE SULFATE 4 MILLIGRAM(S): 30 TABLET, EXTENDED RELEASE ORAL at 21:13

## 2024-11-10 RX ADMIN — Medication 1000 MILLIGRAM(S): at 19:39

## 2024-11-10 RX ADMIN — SODIUM CHLORIDE 500 MILLILITER(S): 9 INJECTION, SOLUTION INTRAMUSCULAR; INTRAVENOUS; SUBCUTANEOUS at 19:19

## 2024-11-10 RX ADMIN — METHOCARBAMOL 750 MILLIGRAM(S): 500 TABLET ORAL at 23:27

## 2024-11-10 RX ADMIN — Medication 400 MILLIGRAM(S): at 18:42

## 2024-11-10 RX ADMIN — LIDOCAINE HYDROCHLORIDE 2 PATCH: 40 SOLUTION TOPICAL at 19:08

## 2024-11-10 RX ADMIN — LIDOCAINE HYDROCHLORIDE 2 PATCH: 40 SOLUTION TOPICAL at 18:39

## 2024-11-10 NOTE — ED ADULT NURSE NOTE - CHIEF COMPLAINT QUOTE
pt c/o 3 days body aches, right side neck/ shoulder pain with dizziness, sob, fever. some relief with Tylenol.  past med hx htn, hld dm2

## 2024-11-10 NOTE — ED ADULT NURSE NOTE - OBJECTIVE STATEMENT
pt rec'd to room 11, A&Ox3 c/o bilateral neck pain since friday. pt denies trauma or injury but admits to heavy lifting. pt denies CP. hx of HTN, DM, high cholesterol, PSH of spinal fusion. pt pending MD sharpe and orders. daughter at bedside.

## 2024-11-10 NOTE — ED PROVIDER NOTE - CLINICAL SUMMARY MEDICAL DECISION MAKING FREE TEXT BOX
Afebrile hemodynamically stable female presents to ED for bilateral neck pain (right greater than left) x 2 days with associated tension-like headache.  Normal EOM, PERRLA.  Cranial and neurologically intact.  Tenderness to palpation along the right trapezial region with decreased ROM with extension, flexion and right rotation.  No midline tenderness C/T/L.  No focal deficits.  Normal sensation, neurovascular intact.  Ordered basic labs, flu/COVID, mag/Phos, CT head, CXR to rule out intracranial pathology versus electrolyte abnormality versus infectious etiology.  Given pain meds, fluids, Reglan, reassess.

## 2024-11-10 NOTE — ED PROVIDER NOTE - PATIENT PORTAL LINK FT
You can access the FollowMyHealth Patient Portal offered by [facility] by registering at the following website: [email or web address]. By joining [facility]’s FollowMyHealth portal, you will also be able to view your health information using other applications (apps) compatible with our system.

## 2024-11-10 NOTE — ED PROVIDER NOTE - OBJECTIVE STATEMENT
Spoke to patient, message below was given. Patient states he continues to have the same symptoms. He does have appointments coming up with GI.  Jennifer Alexander CMA on 2/24/2020 at 12:21 PM    Notes recorded by Noah Rangel PA-C on 2/24/2020 at 11:56 AM CST  Please inform patient of negative value.  Electronically signed:    Noah Rangel PA-C  
69F pmhx HTN, HLD, DM (diet controlled), CKD, b/l knee OA presents to ED for bilateral neck pain (right greater than left) x 2 days with associated tension-like headache.  Reports headache was gradual in onset and originated along occipital region. Notes use of Tylenol with little to no relief.  Does not recall any recent trauma or heavy lifting.  States that she sleeps on that affected side which may have incited current symptoms. Denies photosensitivity, nausea, vomiting, fever, chills, chest pain, SOB, numbness/tingling.

## 2024-11-10 NOTE — ED PROVIDER NOTE - PROGRESS NOTE DETAILS
DO Annelise:  CSF nonsuggestive of meningitis.  Patient trialed on Robaxin, reports improvement.  Patient to be discharged on Robaxin with outpatient follow-up.

## 2024-11-10 NOTE — ED PROVIDER NOTE - NSFOLLOWUPINSTRUCTIONS_ED_ALL_ED_FT
You were seen in the emergency department today for neck pain, head pain, and a fever.  You received lab work which showed no signs of infection.  Your electrolytes were normal.  We performed a lumbar puncture and collected cerebrospinal fluid which did not show evidence of meningitis.  You had a CT scan of your head which only showed calcified plaques within your vertebral arteries and carotid arteries.  There is nothing to do about this today, however your primary care physician may want to monitor this.    We are prescribing you with a prescription for a medication called Robaxin.  We gave you a dose here in the emergency department today.  Please take this medication as prescribed.    Please return to the emergency department if your pain worsens, if you are unable to control your pain at home, if you get fever, chills, pain/redness at the site of your lumbar puncture, if you develop any leg weakness, numbness in the legs, or any other symptoms that you find concerning.    Please follow-up with your primary care physician within 1 week for further management.

## 2024-11-10 NOTE — ED ADULT TRIAGE NOTE - CHIEF COMPLAINT QUOTE
pt c/o 3 days body aches, right side neck/ shoulder pain with sob, fever. some relief with Tylenol.  past med hx htn, hld dm2 pt c/o 3 days body aches, right side neck/ shoulder pain with dizziness, sob, fever. some relief with Tylenol.  past med hx htn, hld dm2

## 2024-11-10 NOTE — ED PROVIDER NOTE - ATTENDING CONTRIBUTION TO CARE
DR. BLOCH, ATTENDING MD-  I performed a face to face bedside interview with patient regarding history of present illness, review of symptoms and past medical history. I completed an independent physical exam.  I have discussed patient's plan of care with the resident.  Patient in moderate to severe distress holding her head stiffly neck  stiff positive jolt test positive bilateral cervical adenopathy with tenderness paracervical muscles muscles especially right trapezius   heart sounds normal lungs clear abdomen soft nontender neuroexam alert and oriented x 3 cranial nerves intact motor 5/5 sensation intact tact no past-pointing pulses intact.

## 2024-11-11 VITALS
SYSTOLIC BLOOD PRESSURE: 158 MMHG | HEART RATE: 66 BPM | DIASTOLIC BLOOD PRESSURE: 78 MMHG | TEMPERATURE: 99 F | OXYGEN SATURATION: 100 % | RESPIRATION RATE: 18 BRPM

## 2024-11-11 LAB
CSF PCR RESULT: SIGNIFICANT CHANGE UP
CULTURE RESULTS: SIGNIFICANT CHANGE UP
LDH CSF L TO P-CCNC: 21 U/L — SIGNIFICANT CHANGE UP
LDH FLD-CCNC: 21 U/L — SIGNIFICANT CHANGE UP
SPECIMEN SOURCE: SIGNIFICANT CHANGE UP

## 2024-11-11 RX ORDER — METHOCARBAMOL 500 MG/1
1 TABLET ORAL
Qty: 40 | Refills: 0
Start: 2024-11-11 | End: 2024-11-20

## 2024-11-12 LAB — EBV PCR: SIGNIFICANT CHANGE UP IU/ML

## 2024-11-15 LAB
CULTURE RESULTS: SIGNIFICANT CHANGE UP
SPECIMEN SOURCE: SIGNIFICANT CHANGE UP

## 2024-11-20 ENCOUNTER — NON-APPOINTMENT (OUTPATIENT)
Age: 69
End: 2024-11-20

## 2024-12-26 ENCOUNTER — APPOINTMENT (OUTPATIENT)
Dept: MAMMOGRAPHY | Facility: CLINIC | Age: 69
End: 2024-12-26

## 2024-12-26 ENCOUNTER — APPOINTMENT (OUTPATIENT)
Dept: ULTRASOUND IMAGING | Facility: CLINIC | Age: 69
End: 2024-12-26

## 2025-08-25 ENCOUNTER — NON-APPOINTMENT (OUTPATIENT)
Age: 70
End: 2025-08-25